# Patient Record
Sex: FEMALE | Race: WHITE | NOT HISPANIC OR LATINO
[De-identification: names, ages, dates, MRNs, and addresses within clinical notes are randomized per-mention and may not be internally consistent; named-entity substitution may affect disease eponyms.]

---

## 2017-11-10 PROBLEM — Z00.00 ENCOUNTER FOR PREVENTIVE HEALTH EXAMINATION: Status: ACTIVE | Noted: 2017-11-10

## 2017-11-12 ENCOUNTER — RECORD ABSTRACTING (OUTPATIENT)
Age: 52
End: 2017-11-12

## 2017-11-12 DIAGNOSIS — Z82.49 FAMILY HISTORY OF ISCHEMIC HEART DISEASE AND OTHER DISEASES OF THE CIRCULATORY SYSTEM: ICD-10-CM

## 2017-11-15 ENCOUNTER — APPOINTMENT (OUTPATIENT)
Dept: FAMILY MEDICINE | Facility: CLINIC | Age: 52
End: 2017-11-15
Payer: COMMERCIAL

## 2017-11-15 VITALS
HEART RATE: 80 BPM | HEIGHT: 64 IN | OXYGEN SATURATION: 98 % | TEMPERATURE: 98.1 F | DIASTOLIC BLOOD PRESSURE: 86 MMHG | SYSTOLIC BLOOD PRESSURE: 142 MMHG | BODY MASS INDEX: 33.8 KG/M2 | WEIGHT: 198 LBS | RESPIRATION RATE: 16 BRPM

## 2017-11-15 PROCEDURE — 99213 OFFICE O/P EST LOW 20 MIN: CPT

## 2018-01-17 ENCOUNTER — RX RENEWAL (OUTPATIENT)
Age: 53
End: 2018-01-17

## 2018-01-17 RX ORDER — ALPRAZOLAM 0.5 MG/1
0.5 TABLET ORAL
Refills: 0 | Status: DISCONTINUED | COMMUNITY
End: 2018-01-17

## 2018-02-07 ENCOUNTER — RX RENEWAL (OUTPATIENT)
Age: 53
End: 2018-02-07

## 2018-04-12 ENCOUNTER — MEDICATION RENEWAL (OUTPATIENT)
Age: 53
End: 2018-04-12

## 2018-06-14 ENCOUNTER — MEDICATION RENEWAL (OUTPATIENT)
Age: 53
End: 2018-06-14

## 2018-08-31 ENCOUNTER — APPOINTMENT (OUTPATIENT)
Dept: FAMILY MEDICINE | Facility: CLINIC | Age: 53
End: 2018-08-31
Payer: COMMERCIAL

## 2018-08-31 VITALS
BODY MASS INDEX: 30.39 KG/M2 | WEIGHT: 178 LBS | HEART RATE: 98 BPM | DIASTOLIC BLOOD PRESSURE: 70 MMHG | RESPIRATION RATE: 14 BRPM | SYSTOLIC BLOOD PRESSURE: 124 MMHG | OXYGEN SATURATION: 99 % | HEIGHT: 64 IN

## 2018-08-31 DIAGNOSIS — D18.01 HEMANGIOMA OF SKIN AND SUBCUTANEOUS TISSUE: ICD-10-CM

## 2018-08-31 PROCEDURE — 99214 OFFICE O/P EST MOD 30 MIN: CPT

## 2018-08-31 NOTE — END OF VISIT
[Time Spent: ___ minutes] : I have spent [unfilled] minutes of face to face time with the patient [>50% of Time Spent on Counseling and Coordination of Care for  ___] : Greater than 50% of the encounter time was spent on counseling and coordination of care for [unfilled]

## 2018-08-31 NOTE — HISTORY OF PRESENT ILLNESS
[FreeTextEntry1] : patient presents for med check \par also c/o scratchy throat, + cough, + RN/PND, + HA, + ear pain, + sinus pressure, - apolonia, - temp, x 2-3 days [de-identified] : 54 yo wf here for med check for anxiety\par She also has a scratchy throat cough and post nasal drip for 3 days with no relief with otc agents\par \par I am going to [place] next week\par \par I have a lump in my left arm\par \par I was getting a flash of lightening in my eye\par \par I might be getting arthritis in my left knee I get a ache when I go up stairs.\par \par I have 2 spots- left cheek ear area and left chest area

## 2018-08-31 NOTE — REVIEW OF SYSTEMS
[Fatigue] : fatigue [Recent Change In Weight] : ~T recent weight change [Nasal Discharge] : nasal discharge [Sore Throat] : sore throat [Palpitations] : palpitations [Negative] : Genitourinary

## 2018-08-31 NOTE — PHYSICAL EXAM
[Looks Tired] : appears tired [Hoarse] : was hoarse [Normal Rhythm/Effort] : normal respiratory rhythm and effort [Clear Bilaterally] : the lungs were clear to auscultation bilaterally [Normal to Percussion] : the lungs were normal to percussion [Normal] : palpation of the chest was normal [Normal Rate] : normal [Normal S1] : normal S1 [Normal S2] : normal S2 [No Murmur] : no murmurs heard [No Pitting Edema] : no pitting edema present [2+] : left 2+ [No Abnormalities] : the abdominal aorta was not enlarged and no bruit was heard [Harsh] : was not harsh [S3] : no S3 [S4] : no S4 [Right Carotid Bruit] : no bruit heard over the right carotid [Left Carotid Bruit] : no bruit heard over the left carotid [Right Femoral Bruit] : no bruit heard over the right femoral artery [Left Femoral Bruit] : no bruit heard over the left femoral artery [de-identified] : nasal congestion throat red [de-identified] : left elbow swollen lump medial elbow.   left knee crepitus [de-identified] : left cheek brown raised lesion   left chest cherry angioma

## 2018-08-31 NOTE — ASSESSMENT
[FreeTextEntry1] :  As per usual protocol the patient was advised in regards to the risks of driving when on medications with side effects of dizziness or drowsiness. Patient has been assessed  for increase risk for respiratory depression. Istop checked.\par follow up opthalmology, dermatology, orthopedic, colonoscopy, \par Take antibiotics,  rest,  increase fluids, wash hands to prevent the spread of  infection . Take mucinex dm over the counter. Take tylenol or advil for fever or body aches. Follow up if no better or worse respiratory symptoms.\par follow up cpe

## 2019-01-02 ENCOUNTER — MEDICATION RENEWAL (OUTPATIENT)
Age: 54
End: 2019-01-02

## 2019-01-07 ENCOUNTER — APPOINTMENT (OUTPATIENT)
Dept: FAMILY MEDICINE | Facility: CLINIC | Age: 54
End: 2019-01-07
Payer: COMMERCIAL

## 2019-01-07 VITALS
OXYGEN SATURATION: 98 % | SYSTOLIC BLOOD PRESSURE: 124 MMHG | HEIGHT: 64 IN | WEIGHT: 171 LBS | HEART RATE: 88 BPM | RESPIRATION RATE: 14 BRPM | BODY MASS INDEX: 29.19 KG/M2 | DIASTOLIC BLOOD PRESSURE: 74 MMHG

## 2019-01-07 DIAGNOSIS — Z87.09 PERSONAL HISTORY OF OTHER DISEASES OF THE RESPIRATORY SYSTEM: ICD-10-CM

## 2019-01-07 LAB — CYTOLOGY CVX/VAG DOC THIN PREP: NORMAL

## 2019-01-07 PROCEDURE — 99213 OFFICE O/P EST LOW 20 MIN: CPT

## 2019-01-07 NOTE — HISTORY OF PRESENT ILLNESS
[FreeTextEntry1] : Patient presents for med check and blood work  [de-identified] : 52 yo wf here for follow up and anxiety\par I want to up the medication. I took 1 mg and slept so much better. Out of nowhere I get shaky and feel racy inside. I am not sure if it is attributed to perimenopause.   I haven't had it for one week and took benadryl for sleep. It didn’t help my sleep I did not go through withdrawal. I don’t want to take anything else like an antidepressant or anxiety. \par I fell the other day and hurt my shoulder\par I am getting a lump on my thumb\par I started walking and doing carb. \par

## 2019-01-07 NOTE — PLAN
[FreeTextEntry1] : try estroven for hot flashes. renew xanax. patient not suicidal.  As per usual protocol the patient was advised in regards to the risks of driving when on medications with side effects of dizziness or drowsiness. Patient has been assessed  for increase risk for respiratory depression. Istop checked.\par Labs drawn/ specimens obtained  in office on this date of service  for evaluation of   assessed conditions -     hep c hiv hyperlipidemia to be run at [facility].\par

## 2019-01-07 NOTE — HEALTH RISK ASSESSMENT
[No falls in past year] : Patient reported no falls in the past year [0] : 2) Feeling down, depressed, or hopeless: Not at all (0) [] : No [de-identified] : no [de-identified] : no [[id number]] : 0

## 2019-01-08 ENCOUNTER — MED ADMIN CHARGE (OUTPATIENT)
Age: 54
End: 2019-01-08

## 2019-01-08 LAB
ALBUMIN SERPL ELPH-MCNC: 4.5 G/DL
ALP BLD-CCNC: 48 U/L
ALT SERPL-CCNC: 11 U/L
ANION GAP SERPL CALC-SCNC: 9 MMOL/L
AST SERPL-CCNC: 15 U/L
BASOPHILS # BLD AUTO: 0.02 K/UL
BASOPHILS NFR BLD AUTO: 0.3 %
BILIRUB SERPL-MCNC: 0.8 MG/DL
BUN SERPL-MCNC: 22 MG/DL
CALCIUM SERPL-MCNC: 9.8 MG/DL
CHLORIDE SERPL-SCNC: 107 MMOL/L
CHOLEST SERPL-MCNC: 255 MG/DL
CHOLEST/HDLC SERPL: 3.8 RATIO
CO2 SERPL-SCNC: 27 MMOL/L
CREAT SERPL-MCNC: 0.74 MG/DL
EOSINOPHIL # BLD AUTO: 0.19 K/UL
EOSINOPHIL NFR BLD AUTO: 2.6 %
GLUCOSE SERPL-MCNC: 97 MG/DL
HBA1C MFR BLD HPLC: 5.6 %
HCT VFR BLD CALC: 45.6 %
HCV AB SER QL: NONREACTIVE
HCV S/CO RATIO: 0.15 S/CO
HDLC SERPL-MCNC: 67 MG/DL
HGB BLD-MCNC: 14.5 G/DL
HIV1+2 AB SPEC QL IA.RAPID: NONREACTIVE
IMM GRANULOCYTES NFR BLD AUTO: 0.3 %
LDLC SERPL CALC-MCNC: 168 MG/DL
LYMPHOCYTES # BLD AUTO: 2.05 K/UL
LYMPHOCYTES NFR BLD AUTO: 27.6 %
MAN DIFF?: NORMAL
MCHC RBC-ENTMCNC: 27.8 PG
MCHC RBC-ENTMCNC: 31.8 GM/DL
MCV RBC AUTO: 87.4 FL
MONOCYTES # BLD AUTO: 0.39 K/UL
MONOCYTES NFR BLD AUTO: 5.2 %
NEUTROPHILS # BLD AUTO: 4.77 K/UL
NEUTROPHILS NFR BLD AUTO: 64 %
PLATELET # BLD AUTO: 313 K/UL
POTASSIUM SERPL-SCNC: 5 MMOL/L
PROT SERPL-MCNC: 7.6 G/DL
RBC # BLD: 5.22 M/UL
RBC # FLD: 12.9 %
SODIUM SERPL-SCNC: 143 MMOL/L
T3FREE SERPL-MCNC: 2.57 PG/ML
T4 FREE SERPL-MCNC: 1.4 NG/DL
TRIGL SERPL-MCNC: 99 MG/DL
TSH SERPL-ACNC: 0.69 UIU/ML
WBC # FLD AUTO: 7.44 K/UL

## 2019-03-07 ENCOUNTER — RX RENEWAL (OUTPATIENT)
Age: 54
End: 2019-03-07

## 2019-04-02 ENCOUNTER — APPOINTMENT (OUTPATIENT)
Dept: FAMILY MEDICINE | Facility: CLINIC | Age: 54
End: 2019-04-02
Payer: COMMERCIAL

## 2019-04-02 VITALS
HEIGHT: 64 IN | HEART RATE: 100 BPM | BODY MASS INDEX: 28.34 KG/M2 | RESPIRATION RATE: 14 BRPM | SYSTOLIC BLOOD PRESSURE: 128 MMHG | WEIGHT: 166 LBS | DIASTOLIC BLOOD PRESSURE: 80 MMHG | OXYGEN SATURATION: 99 %

## 2019-04-02 PROCEDURE — 36415 COLL VENOUS BLD VENIPUNCTURE: CPT

## 2019-04-02 PROCEDURE — 99213 OFFICE O/P EST LOW 20 MIN: CPT | Mod: 25

## 2019-04-02 NOTE — ASSESSMENT
[FreeTextEntry1] : Basic cardiovascular prevention measures are advised including regular exercise, surveillance medical examination, and prudent portion-controlled low fat diet, rich in a variety of vegetables with minimal added sugars, refined starches, and no artificially hydrogenated oils.\par Labs drawn/ specimens obtained  in office on this date of service  for evaluation of   assessed conditions -  lipid cmp    to be run at [facility].\par  As per usual protocol the patient was advised in regards to the risks of driving when on medications with side effects of dizziness or drowsiness. Patient has been assessed  for increase risk for respiratory depression. Patient denies suicidal ideations or plan.  Istop checked.\par colonscopy due she refused- she doesn’t like anesthesia. she  will do cologuard. form given\par

## 2019-04-02 NOTE — HISTORY OF PRESENT ILLNESS
[FreeTextEntry1] : Patient presents for 3 month check\par  [de-identified] : 54 yo wf here for follow up on medications for anxiety She refused colonoscopy but will do cologuard\par her  had aneurysm surgery and she has been anxious and her stomach has been bothering her so she is taking the xanax and the omeprazole. She is tolerating the cholesterol meds. She feels otherwise well no cp sob or leg edema or myalgia

## 2019-04-03 LAB
ALBUMIN SERPL ELPH-MCNC: 4.4 G/DL
ALP BLD-CCNC: 55 U/L
ALT SERPL-CCNC: 18 U/L
ANION GAP SERPL CALC-SCNC: 11 MMOL/L
AST SERPL-CCNC: 16 U/L
BILIRUB SERPL-MCNC: 0.8 MG/DL
BUN SERPL-MCNC: 19 MG/DL
CALCIUM SERPL-MCNC: 9.7 MG/DL
CHLORIDE SERPL-SCNC: 104 MMOL/L
CHOLEST SERPL-MCNC: 153 MG/DL
CHOLEST/HDLC SERPL: 3.1 RATIO
CO2 SERPL-SCNC: 26 MMOL/L
CREAT SERPL-MCNC: 0.65 MG/DL
GLUCOSE SERPL-MCNC: 97 MG/DL
HDLC SERPL-MCNC: 49 MG/DL
LDLC SERPL CALC-MCNC: 77 MG/DL
POTASSIUM SERPL-SCNC: 4 MMOL/L
PROT SERPL-MCNC: 6.7 G/DL
SODIUM SERPL-SCNC: 141 MMOL/L
TRIGL SERPL-MCNC: 137 MG/DL

## 2019-06-03 ENCOUNTER — MEDICATION RENEWAL (OUTPATIENT)
Age: 54
End: 2019-06-03

## 2019-07-10 ENCOUNTER — APPOINTMENT (OUTPATIENT)
Dept: FAMILY MEDICINE | Facility: CLINIC | Age: 54
End: 2019-07-10
Payer: COMMERCIAL

## 2019-07-10 VITALS
OXYGEN SATURATION: 98 % | BODY MASS INDEX: 28.34 KG/M2 | SYSTOLIC BLOOD PRESSURE: 124 MMHG | RESPIRATION RATE: 14 BRPM | HEIGHT: 64 IN | WEIGHT: 166 LBS | HEART RATE: 84 BPM | DIASTOLIC BLOOD PRESSURE: 70 MMHG

## 2019-07-10 PROCEDURE — 99213 OFFICE O/P EST LOW 20 MIN: CPT | Mod: 25

## 2019-07-10 PROCEDURE — 36415 COLL VENOUS BLD VENIPUNCTURE: CPT

## 2019-07-10 NOTE — HISTORY OF PRESENT ILLNESS
[FreeTextEntry1] : Patient presents for med check\par  [de-identified] : 55 yo wf here for anxiety and hyperlipidemia.

## 2019-07-10 NOTE — HEALTH RISK ASSESSMENT
[No] : In the past 12 months have you used drugs other than those required for medical reasons? No [] : No [de-identified] : no [de-identified] : low carb

## 2019-07-10 NOTE — PHYSICAL EXAM
[No Acute Distress] : no acute distress [Well Nourished] : well nourished [Well Developed] : well developed [Well-Appearing] : well-appearing [Normal Sclera/Conjunctiva] : normal sclera/conjunctiva [PERRL] : pupils equal round and reactive to light [Normal Oropharynx] : the oropharynx was normal [Normal Outer Ear/Nose] : the outer ears and nose were normal in appearance [EOMI] : extraocular movements intact [No JVD] : no jugular venous distention [No Lymphadenopathy] : no lymphadenopathy [Supple] : supple [Thyroid Normal, No Nodules] : the thyroid was normal and there were no nodules present [No Respiratory Distress] : no respiratory distress  [Clear to Auscultation] : lungs were clear to auscultation bilaterally [No Accessory Muscle Use] : no accessory muscle use [Normal Rate] : normal rate  [Normal S1, S2] : normal S1 and S2 [Regular Rhythm] : with a regular rhythm [No Abdominal Bruit] : a ~M bruit was not heard ~T in the abdomen [No Murmur] : no murmur heard [No Carotid Bruits] : no carotid bruits [Pedal Pulses Present] : the pedal pulses are present [No Edema] : there was no peripheral edema [No Varicosities] : no varicosities [No Palpable Aorta] : no palpable aorta [No Extremity Clubbing/Cyanosis] : no extremity clubbing/cyanosis [Soft] : abdomen soft [Non-distended] : non-distended [Non Tender] : non-tender [No HSM] : no HSM [Normal Bowel Sounds] : normal bowel sounds [No Masses] : no abdominal mass palpated [Normal Posterior Cervical Nodes] : no posterior cervical lymphadenopathy [Normal Anterior Cervical Nodes] : no anterior cervical lymphadenopathy [No CVA Tenderness] : no CVA  tenderness [No Spinal Tenderness] : no spinal tenderness [Grossly Normal Strength/Tone] : grossly normal strength/tone [No Joint Swelling] : no joint swelling [No Focal Deficits] : no focal deficits [No Rash] : no rash [Coordination Grossly Intact] : coordination grossly intact [Normal Gait] : normal gait [Normal Affect] : the affect was normal [Deep Tendon Reflexes (DTR)] : deep tendon reflexes were 2+ and symmetric [Normal Insight/Judgement] : insight and judgment were intact

## 2019-07-11 LAB
ALBUMIN SERPL ELPH-MCNC: 4.4 G/DL
ALP BLD-CCNC: 77 U/L
ALT SERPL-CCNC: 16 U/L
ANION GAP SERPL CALC-SCNC: 9 MMOL/L
AST SERPL-CCNC: 15 U/L
BILIRUB SERPL-MCNC: 0.3 MG/DL
BUN SERPL-MCNC: 22 MG/DL
CALCIUM SERPL-MCNC: 9.4 MG/DL
CHLORIDE SERPL-SCNC: 107 MMOL/L
CHOLEST SERPL-MCNC: 168 MG/DL
CHOLEST/HDLC SERPL: 2.6 RATIO
CO2 SERPL-SCNC: 27 MMOL/L
CREAT SERPL-MCNC: 0.65 MG/DL
GLUCOSE SERPL-MCNC: 114 MG/DL
HDLC SERPL-MCNC: 66 MG/DL
LDLC SERPL CALC-MCNC: 85 MG/DL
POTASSIUM SERPL-SCNC: 4.7 MMOL/L
PROT SERPL-MCNC: 6.6 G/DL
SODIUM SERPL-SCNC: 143 MMOL/L
TRIGL SERPL-MCNC: 83 MG/DL

## 2019-10-16 ENCOUNTER — APPOINTMENT (OUTPATIENT)
Dept: FAMILY MEDICINE | Facility: CLINIC | Age: 54
End: 2019-10-16
Payer: COMMERCIAL

## 2019-10-16 VITALS
SYSTOLIC BLOOD PRESSURE: 120 MMHG | HEART RATE: 74 BPM | WEIGHT: 168 LBS | BODY MASS INDEX: 28.68 KG/M2 | DIASTOLIC BLOOD PRESSURE: 70 MMHG | HEIGHT: 64 IN | OXYGEN SATURATION: 98 % | RESPIRATION RATE: 14 BRPM

## 2019-10-16 LAB
ALBUMIN SERPL ELPH-MCNC: 4.6 G/DL
ALP BLD-CCNC: 56 U/L
ALT SERPL-CCNC: 32 U/L
ANION GAP SERPL CALC-SCNC: 12 MMOL/L
AST SERPL-CCNC: 22 U/L
BILIRUB SERPL-MCNC: 1.2 MG/DL
BUN SERPL-MCNC: 16 MG/DL
CALCIUM SERPL-MCNC: 9.7 MG/DL
CHLORIDE SERPL-SCNC: 101 MMOL/L
CO2 SERPL-SCNC: 27 MMOL/L
CREAT SERPL-MCNC: 0.67 MG/DL
FERRITIN SERPL-MCNC: 150 NG/ML
GLUCOSE SERPL-MCNC: 85 MG/DL
IRON SATN MFR SERPL: 42 %
IRON SERPL-MCNC: 126 UG/DL
POTASSIUM SERPL-SCNC: 4.4 MMOL/L
PROT SERPL-MCNC: 7 G/DL
SODIUM SERPL-SCNC: 140 MMOL/L
TIBC SERPL-MCNC: 302 UG/DL
UIBC SERPL-MCNC: 176 UG/DL

## 2019-10-16 PROCEDURE — 36415 COLL VENOUS BLD VENIPUNCTURE: CPT

## 2019-10-16 PROCEDURE — 99213 OFFICE O/P EST LOW 20 MIN: CPT | Mod: 25

## 2019-10-16 RX ORDER — IPRATROPIUM BROMIDE 42 UG/1
0.06 SPRAY NASAL
Qty: 1 | Refills: 11 | Status: COMPLETED | COMMUNITY
Start: 2019-07-10 | End: 2019-10-16

## 2019-10-16 NOTE — HISTORY OF PRESENT ILLNESS
[de-identified] : 54 year old female presenting for a medication renewal follow up. Patient is here for follow up for hyperlipidemia and anxiety.  Patient is taking one tablet at night of alprazolam and this is helping for anxiety, and for acute anxiety during the week, about 1-2 times per week. Patient is feeling well.  She denies any myalgias, denies chest pain, palpitations.  she takes all medications as prescribed.  [FreeTextEntry1] : Patient presents for med check\par

## 2019-10-16 NOTE — COUNSELING
[Encouraged to maintain food diary] : Encouraged to maintain food diary [Benefits of weight loss discussed] : Benefits of weight loss discussed [None] : None [Encouraged to increase physical activity] : Encouraged to increase physical activity [Good understanding] : Patient has a good understanding of disease, goals and obesity follow-up plan

## 2019-10-16 NOTE — ASSESSMENT
[FreeTextEntry1] : Discussed prior lipid levels and encouraged patient to continue taking medication and prescribed with decreased carbohydrate intake and decreased saturated fats.\par \par Basic cardiovascular prevention measures are advised including regular exercise, surveillance medical examination, and prudent portion-controlled low fat diet, rich in a variety of vegetables with minimal added sugars, refined starches, and no artificially hydrogenated oils.\par \par  As per usual protocol the patient was advised in regards to the risks of driving when on medications with side effects of dizziness or drowsiness. Patient has been assessed  for increase risk for respiratory depression. Patient denies suicidal ideations or plan.  Istop checked.\par \par Labs drawn/ specimens obtained  in office on this date of service  for evaluation of   assessed conditions -      to be run at [facility]. Lipid panel, Iron deficiency, CMP.   Patient requesting iron level to be checked as she has had low iron levels in the past and reports some fatigue. \par \par To Follow up with lab results.\par \par This assessment was perform by Yolie Gómez , RN  Nurse Practitioner student  from [facility] with Nida Espinosa supervising FNP.\par

## 2019-10-16 NOTE — HEALTH RISK ASSESSMENT
[1 or 2 (0 pts)] : 1 or 2 (0 points) [Never (0 pts)] : Never (0 points) [No] : In the past 12 months have you used drugs other than those required for medical reasons? No [No falls in past year] : Patient reported no falls in the past year [0] : 2) Feeling down, depressed, or hopeless: Not at all (0) [] : No [de-identified] : none [de-identified] : no specialists [Audit-CScore] : 0 [de-identified] : Not active  [de-identified] : healthy diet, low carb [[id number]] : 0

## 2019-10-16 NOTE — REVIEW OF SYSTEMS
[Anxiety] : anxiety [Negative] : Heme/Lymph [Fever] : no fever [Chills] : no chills [Fatigue] : fatigue [Night Sweats] : no night sweats [Hot Flashes] : no hot flashes [Recent Change In Weight] : ~T no recent weight change [FreeTextEntry2] : she reports fatigue and states she has felt this tired before due to low iron levels

## 2019-10-17 LAB
CHOLEST SERPL-MCNC: 135 MG/DL
CHOLEST/HDLC SERPL: 2.6 RATIO
HDLC SERPL-MCNC: 53 MG/DL
LDLC SERPL CALC-MCNC: 63 MG/DL
TRIGL SERPL-MCNC: 93 MG/DL

## 2020-01-14 ENCOUNTER — APPOINTMENT (OUTPATIENT)
Dept: FAMILY MEDICINE | Facility: CLINIC | Age: 55
End: 2020-01-14
Payer: COMMERCIAL

## 2020-01-14 VITALS
BODY MASS INDEX: 29.88 KG/M2 | SYSTOLIC BLOOD PRESSURE: 120 MMHG | TEMPERATURE: 98.8 F | WEIGHT: 175 LBS | HEIGHT: 64 IN | RESPIRATION RATE: 14 BRPM | DIASTOLIC BLOOD PRESSURE: 82 MMHG | HEART RATE: 77 BPM | OXYGEN SATURATION: 98 %

## 2020-01-14 DIAGNOSIS — Z87.09 PERSONAL HISTORY OF OTHER DISEASES OF THE RESPIRATORY SYSTEM: ICD-10-CM

## 2020-01-14 DIAGNOSIS — M79.89 OTHER SPECIFIED SOFT TISSUE DISORDERS: ICD-10-CM

## 2020-01-14 DIAGNOSIS — Z86.79 PERSONAL HISTORY OF OTHER DISEASES OF THE CIRCULATORY SYSTEM: ICD-10-CM

## 2020-01-14 DIAGNOSIS — Z13.220 ENCOUNTER FOR SCREENING FOR LIPOID DISORDERS: ICD-10-CM

## 2020-01-14 DIAGNOSIS — M25.569 PAIN IN UNSPECIFIED KNEE: ICD-10-CM

## 2020-01-14 DIAGNOSIS — Z13.0 ENCOUNTER FOR SCREENING FOR DISEASES OF THE BLOOD AND BLOOD-FORMING ORGANS AND CERTAIN DISORDERS INVOLVING THE IMMUNE MECHANISM: ICD-10-CM

## 2020-01-14 DIAGNOSIS — Z13.1 ENCOUNTER FOR SCREENING FOR DIABETES MELLITUS: ICD-10-CM

## 2020-01-14 PROCEDURE — 99213 OFFICE O/P EST LOW 20 MIN: CPT | Mod: 25

## 2020-01-14 PROCEDURE — 36415 COLL VENOUS BLD VENIPUNCTURE: CPT

## 2020-01-14 NOTE — HEALTH RISK ASSESSMENT
[Never (0 pts)] : Never (0 points) [1 or 2 (0 pts)] : 1 or 2 (0 points) [No falls in past year] : Patient reported no falls in the past year [No] : In the past 12 months have you used drugs other than those required for medical reasons? No [0] : 1) Little interest or pleasure doing things: Not at all (0) [] : No [de-identified] : no specialists [de-identified] : none [Audit-CScore] : 0 [de-identified] : Not active  [[id number]] : 0 [de-identified] : healthy diet, low carb

## 2020-01-14 NOTE — COUNSELING
[Benefits of weight loss discussed] : Benefits of weight loss discussed [Encouraged to increase physical activity] : Encouraged to increase physical activity [Encouraged to maintain food diary] : Encouraged to maintain food diary [None] : None [Good understanding] : Patient has a good understanding of lifestyle changes and steps needed to achieve self management goal

## 2020-01-14 NOTE — REVIEW OF SYSTEMS
[Fatigue] : fatigue [Anxiety] : anxiety [Negative] : Psychiatric [Fever] : no fever [Chills] : no chills [Hot Flashes] : no hot flashes [Night Sweats] : no night sweats [Recent Change In Weight] : ~T no recent weight change [FreeTextEntry2] : she reports fatigue and states she has felt this tired before due to low iron levels

## 2020-01-14 NOTE — HISTORY OF PRESENT ILLNESS
[FreeTextEntry1] : Patient presents for med check\par  [de-identified] : 54 year old female presenting for a medication renewal follow up.  I ate a lot over the holidays.  I always feel like I am getting a cold. I have a runny nose then it stops I feel tired then I don’t feel tired.  Patient is here for follow up for hyperlipidemia and anxiety.  Patient is taking one tablet at night of alprazolam and this is helping for anxiety, and for acute anxiety during the week, about 1-2 times per week. Patient is feeling well.  She denies any myalgias, denies chest pain, palpitations.  she takes all medications as prescribed.

## 2020-01-14 NOTE — ASSESSMENT
[FreeTextEntry1] : Discussed prior lipid levels and encouraged patient to continue taking medication and prescribed with decreased carbohydrate intake and decreased saturated fats.\par \par Basic cardiovascular prevention measures are advised including regular exercise, surveillance medical examination, and prudent portion-controlled low fat diet, rich in a variety of vegetables with minimal added sugars, refined starches, and no artificially hydrogenated oils.\par \par  As per usual protocol the patient was advised in regards to the risks of driving when on medications with side effects of dizziness or drowsiness. Patient has been assessed  for increase risk for respiratory depression. Patient denies suicidal ideations or plan.  Istop checked.\par \par Labs drawn/ specimens obtained  in office on this date of service  for evaluation of   assessed conditions -      to be run at [facility]. Lipid panel, Iron deficiency, CMP.   Patient requesting iron level to be checked as she has had low iron levels in the past and reports some fatigue. \par \par To Follow up with lab results.\par \par This assessment was perform by Yolie Gómez , RN  Nurse Practitioner student  from [facility] with Nida Espinosa supervising FNP.\par

## 2020-01-15 LAB
ALBUMIN SERPL ELPH-MCNC: 4.5 G/DL
ALP BLD-CCNC: 55 U/L
ALT SERPL-CCNC: 27 U/L
ANION GAP SERPL CALC-SCNC: 13 MMOL/L
AST SERPL-CCNC: 20 U/L
BILIRUB SERPL-MCNC: 0.9 MG/DL
BUN SERPL-MCNC: 20 MG/DL
CALCIUM SERPL-MCNC: 9.8 MG/DL
CHLORIDE SERPL-SCNC: 103 MMOL/L
CHOLEST SERPL-MCNC: 237 MG/DL
CHOLEST/HDLC SERPL: 2.9 RATIO
CO2 SERPL-SCNC: 23 MMOL/L
CREAT SERPL-MCNC: 0.59 MG/DL
GLUCOSE SERPL-MCNC: 95 MG/DL
HDLC SERPL-MCNC: 83 MG/DL
LDLC SERPL CALC-MCNC: 132 MG/DL
POTASSIUM SERPL-SCNC: 4.4 MMOL/L
PROT SERPL-MCNC: 6.9 G/DL
SODIUM SERPL-SCNC: 139 MMOL/L
TRIGL SERPL-MCNC: 111 MG/DL

## 2020-03-31 ENCOUNTER — APPOINTMENT (OUTPATIENT)
Dept: FAMILY MEDICINE | Facility: CLINIC | Age: 55
End: 2020-03-31

## 2020-06-15 ENCOUNTER — APPOINTMENT (OUTPATIENT)
Dept: FAMILY MEDICINE | Facility: CLINIC | Age: 55
End: 2020-06-15
Payer: SELF-PAY

## 2020-06-15 VITALS
DIASTOLIC BLOOD PRESSURE: 76 MMHG | OXYGEN SATURATION: 98 % | SYSTOLIC BLOOD PRESSURE: 118 MMHG | BODY MASS INDEX: 28.85 KG/M2 | WEIGHT: 169 LBS | RESPIRATION RATE: 14 BRPM | TEMPERATURE: 98.4 F | HEIGHT: 64 IN | HEART RATE: 80 BPM

## 2020-06-15 PROCEDURE — 99213 OFFICE O/P EST LOW 20 MIN: CPT | Mod: 25

## 2020-06-15 PROCEDURE — 36415 COLL VENOUS BLD VENIPUNCTURE: CPT

## 2020-06-15 NOTE — REVIEW OF SYSTEMS
[Fatigue] : fatigue [Anxiety] : anxiety [Negative] : Heme/Lymph [Fever] : no fever [Hot Flashes] : no hot flashes [Night Sweats] : no night sweats [Chills] : no chills [FreeTextEntry2] : she reports fatigue and states she has felt this tired before due to low iron levels [Recent Change In Weight] : ~T no recent weight change

## 2020-06-15 NOTE — HISTORY OF PRESENT ILLNESS
[FreeTextEntry1] : Patient presents for med check\par  [de-identified] : 54 year old female presenting for a medication renewal follow up.  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 I get headaches I think it is allergies. I am still  tired. \par \par

## 2020-06-15 NOTE — ASSESSMENT
[FreeTextEntry1] : Pt has no insurance and will do minimal lab today. \par \par Basic cardiovascular prevention measures are advised including regular exercise, surveillance medical examination, and prudent portion-controlled low fat diet, rich in a variety of vegetables with minimal added sugars, refined starches, and no artificially hydrogenated oils.\par \par  As per usual protocol the patient was advised in regards to the risks of driving when on medications with side effects of dizziness or drowsiness. Patient has been assessed  for increase risk for respiratory depression. Patient denies suicidal ideations or plan.  Istop checked.\par \par Labs drawn/ specimens obtained  in office on this date of service  for evaluation of   assessed conditions - lipid     to be run at [facility]. Lipid panel, Iron deficiency, CMP.   Patient requesting iron level to be checked as she has had low iron levels in the past and reports some fatigue. \par \par To Follow up with lab results.\par \par This assessment was perform by Yolie Gómez , RN  Nurse Practitioner student  from [facility] with Nida Espinosa supervising FNP.\par

## 2020-06-15 NOTE — HEALTH RISK ASSESSMENT
[1 or 2 (0 pts)] : 1 or 2 (0 points) [Never (0 pts)] : Never (0 points) [No] : In the past 12 months have you used drugs other than those required for medical reasons? No [No falls in past year] : Patient reported no falls in the past year [0] : 2) Feeling down, depressed, or hopeless: Not at all (0) [] : No [de-identified] : no specialists [de-identified] : none [Audit-CScore] : 0 [de-identified] : Not active  [[id number]] : 0 [de-identified] : healthy diet, low carb

## 2020-06-16 LAB
CHOLEST SERPL-MCNC: 125 MG/DL
CHOLEST/HDLC SERPL: 2.1 RATIO
HDLC SERPL-MCNC: 60 MG/DL
LDLC SERPL CALC-MCNC: 43 MG/DL
TRIGL SERPL-MCNC: 109 MG/DL

## 2020-10-20 ENCOUNTER — APPOINTMENT (OUTPATIENT)
Dept: FAMILY MEDICINE | Facility: CLINIC | Age: 55
End: 2020-10-20
Payer: SELF-PAY

## 2020-10-20 VITALS
SYSTOLIC BLOOD PRESSURE: 146 MMHG | OXYGEN SATURATION: 97 % | WEIGHT: 169 LBS | DIASTOLIC BLOOD PRESSURE: 90 MMHG | RESPIRATION RATE: 16 BRPM | HEART RATE: 76 BPM | BODY MASS INDEX: 28.85 KG/M2 | HEIGHT: 64 IN

## 2020-10-20 VITALS — TEMPERATURE: 98 F

## 2020-10-20 PROCEDURE — 99214 OFFICE O/P EST MOD 30 MIN: CPT

## 2020-10-20 NOTE — ASSESSMENT
[FreeTextEntry1] : - Patient advised of harmful side effects and risk of dependence with long term use of benzodiazepines. Advised caution related to sedative effect and and respiratory depression. Advised not to take while driving or in combination with alcohol. Meditation, mindfulness, exercise, and psychotherapy encouraged. ISTOP checked. Reference #: [id number]. Narcotic contract signed, Urine drug screen ordered. \par  -Bp stable but elevated today - monitor bp at home twice daily. RTO in 2 weeks for follow up. Pt will RTO sooner if bp persistently 160/90. Pt will go to ER if chest pain or sob arise. pt advised to follow low salt, low fat diet. Pt encouraged to exercise and increase weight loss efforts.\par - Mammo rx given, pt agrees to complete, difficult since lack of insurance.\par -Advised to get pap.

## 2020-10-20 NOTE — HISTORY OF PRESENT ILLNESS
[FreeTextEntry1] : pt. presents for medication renewal  [de-identified] : Patient presents today for follow up and medications renewal. States she uses xanax intermittently for anxiety. Denies side effects, aberrant behaviors, or suicidal thoughts/plans. Patient last took the medication on friday.

## 2020-10-23 LAB
AMPHET UR-MCNC: NEGATIVE
BARBITURATES UR-MCNC: NEGATIVE
BENZODIAZ UR-MCNC: NEGATIVE
COCAINE METAB.OTHER UR-MCNC: NEGATIVE
CREATININE, URINE: 108.2 MG/DL
METHADONE UR-MCNC: NEGATIVE
METHAQUALONE UR-MCNC: NEGATIVE
OPIATES UR-MCNC: NEGATIVE
PCP UR-MCNC: NEGATIVE
PROPOXYPH UR QL: NEGATIVE
THC UR QL: NEGATIVE

## 2020-11-03 ENCOUNTER — APPOINTMENT (OUTPATIENT)
Dept: FAMILY MEDICINE | Facility: CLINIC | Age: 55
End: 2020-11-03
Payer: SELF-PAY

## 2020-11-03 DIAGNOSIS — Z87.09 PERSONAL HISTORY OF OTHER DISEASES OF THE RESPIRATORY SYSTEM: ICD-10-CM

## 2020-11-03 PROCEDURE — 99442: CPT

## 2020-11-03 NOTE — ASSESSMENT
[FreeTextEntry1] : Rest, fluids, xyzal, vitamin C\par     - advised this is possibly covid despite negative results 2 days prior, encouraged to complete quarantine and isolate from others, wear mask and frequent hand hygiene\par     - patient instructed to follow up if symptoms worsen or persist, if fevers develop, does not get better in 7 days.\par     - Watchful waiting - Patient will wait at least 3 days before initiating antibiotic, will try conservative/supportive treatment first. educated on antibiotic resistance.\par

## 2020-11-03 NOTE — HISTORY OF PRESENT ILLNESS
[Home] : at home, [unfilled] , at the time of the visit. [Medical Office: ([facility])___] : at the medical office located in  [Verbal consent obtained from patient] : the patient, [unfilled] [FreeTextEntry8] : Patient reports today with a chief complaint of sinus pain x 3 days. Patient states she has associated nasal congestion, post nasal drip. She had fever but it has since resolved. Patient states her  was positive for covid but she tested negative on Wednesday. She has been taking emergen-c and otc cough medicine with mild relief. States she does not toelrate nasal sprays well. Denies chills, body aches, cough, sob, weakness.

## 2020-11-03 NOTE — REVIEW OF SYSTEMS
[Nasal Discharge] : nasal discharge [Negative] : Heme/Lymph [Fever] : fever [FreeTextEntry4] : sinus pain

## 2020-12-23 PROBLEM — Z87.09 HISTORY OF ACUTE SINUSITIS: Status: RESOLVED | Noted: 2020-11-03 | Resolved: 2020-12-23

## 2021-03-08 ENCOUNTER — APPOINTMENT (OUTPATIENT)
Dept: FAMILY MEDICINE | Facility: CLINIC | Age: 56
End: 2021-03-08
Payer: COMMERCIAL

## 2021-03-08 VITALS
WEIGHT: 179 LBS | SYSTOLIC BLOOD PRESSURE: 130 MMHG | BODY MASS INDEX: 30.56 KG/M2 | RESPIRATION RATE: 12 BRPM | OXYGEN SATURATION: 97 % | DIASTOLIC BLOOD PRESSURE: 80 MMHG | HEART RATE: 79 BPM | HEIGHT: 64 IN

## 2021-03-08 VITALS — TEMPERATURE: 97.2 F

## 2021-03-08 DIAGNOSIS — Z23 ENCOUNTER FOR IMMUNIZATION: ICD-10-CM

## 2021-03-08 DIAGNOSIS — M72.2 PLANTAR FASCIAL FIBROMATOSIS: ICD-10-CM

## 2021-03-08 DIAGNOSIS — Z11.52 ENCOUNTER FOR SCREENING FOR COVID-19: ICD-10-CM

## 2021-03-08 DIAGNOSIS — Z12.11 ENCOUNTER FOR SCREENING FOR MALIGNANT NEOPLASM OF COLON: ICD-10-CM

## 2021-03-08 DIAGNOSIS — R03.0 ELEVATED BLOOD-PRESSURE READING, W/OUT DIAGNOSIS OF HYPERTENSION: ICD-10-CM

## 2021-03-08 PROCEDURE — 99213 OFFICE O/P EST LOW 20 MIN: CPT

## 2021-03-08 PROCEDURE — 99072 ADDL SUPL MATRL&STAF TM PHE: CPT

## 2021-03-08 RX ORDER — LEVOFLOXACIN 500 MG/1
500 TABLET, FILM COATED ORAL DAILY
Qty: 7 | Refills: 0 | Status: DISCONTINUED | COMMUNITY
Start: 2018-08-31 | End: 2021-03-08

## 2021-03-08 RX ORDER — LEVOCETIRIZINE DIHYDROCHLORIDE 5 MG/1
5 TABLET ORAL DAILY
Qty: 20 | Refills: 0 | Status: DISCONTINUED | COMMUNITY
Start: 2020-11-03 | End: 2021-03-08

## 2021-03-08 NOTE — PHYSICAL EXAM
[No Acute Distress] : no acute distress [Well Nourished] : well nourished [Well Developed] : well developed [Well-Appearing] : well-appearing [Normal Voice/Communication] : normal voice/communication [Coordination Grossly Intact] : coordination grossly intact [Speech Grossly Normal] : speech grossly normal [Normal Mood] : the mood was normal [de-identified] : tenderness on palpation of right calcaneal area plantar aspect

## 2021-03-08 NOTE — REVIEW OF SYSTEMS
[Suicidal] : not suicidal [Anxiety] : anxiety [Depression] : no depression [Negative] : Heme/Lymph [FreeTextEntry9] : foot pain

## 2021-03-08 NOTE — HISTORY OF PRESENT ILLNESS
[FreeTextEntry1] : pt presents for med check  [de-identified] : She presents for renewals of alprazolam and rosuvastatin.  She doesn't take the alprazolam often but when she does it helps with her anxiety.  She has pain on the bottoms of both feet especially when getting out of bed in the morning.

## 2021-03-08 NOTE — PLAN
[FreeTextEntry1] : the urine drug screen from October was reviewed, I-STOP was checked and alprazolam and rosuvastatin were renewed, she was advised to wear supportive shoes and to do heel stretching exercises, to follow up in 3-4 months or sooner prn and to see a podiatrist if the pain persists, she will schedule the mammogram

## 2021-06-01 ENCOUNTER — APPOINTMENT (OUTPATIENT)
Dept: FAMILY MEDICINE | Facility: CLINIC | Age: 56
End: 2021-06-01
Payer: COMMERCIAL

## 2021-06-01 VITALS
WEIGHT: 172 LBS | HEART RATE: 72 BPM | RESPIRATION RATE: 14 BRPM | SYSTOLIC BLOOD PRESSURE: 114 MMHG | OXYGEN SATURATION: 99 % | DIASTOLIC BLOOD PRESSURE: 68 MMHG | BODY MASS INDEX: 29.37 KG/M2 | HEIGHT: 64 IN | TEMPERATURE: 97.7 F

## 2021-06-01 PROCEDURE — 99213 OFFICE O/P EST LOW 20 MIN: CPT | Mod: 25

## 2021-06-01 PROCEDURE — 36415 COLL VENOUS BLD VENIPUNCTURE: CPT

## 2021-06-01 NOTE — ASSESSMENT
[FreeTextEntry1] : Basic cardiovascular prevention measures are advised including regular exercise, surveillance medical examination, and prudent portion-controlled low fat diet, rich in a variety of vegetables with minimal added sugars, refined starches, and no artificially hydrogenated oils. encouraged to exercise more\par Discussion and interpretation of previous tests , external notes( labs, radiology, specialist  , patient verbalized understanding.\par Prescription drug management\par renew xanax\par  As per usual protocol the patient was advised in regards to the risks of driving when on medications with side effects of dizziness or drowsiness. Patient has been assessed  for increase risk for respiratory depression. Patient denies suicidal ideations or plan.  Istop checked.\par Labs drawn/ specimens obtained  in office on this date of service  for evaluation of   assessed conditions -  lipid     to be run at [facility].\par

## 2021-06-01 NOTE — HISTORY OF PRESENT ILLNESS
[FreeTextEntry1] : pt presents for monthly follow up [de-identified] : 56 yo wf here for follow up. \par There is more anxiety lately \par She had covid in October. Since then she has not felt 100%\par Her weight is a problem . she is on a 1200 tamar diet. I cant exercise. I only lost 8 pounds \par My feet hurt

## 2021-06-02 LAB
ALBUMIN SERPL ELPH-MCNC: 4.5 G/DL
ALP BLD-CCNC: 60 U/L
ALT SERPL-CCNC: 14 U/L
ANION GAP SERPL CALC-SCNC: 9 MMOL/L
AST SERPL-CCNC: 19 U/L
BASOPHILS # BLD AUTO: 0.05 K/UL
BASOPHILS NFR BLD AUTO: 0.8 %
BILIRUB SERPL-MCNC: 0.9 MG/DL
BUN SERPL-MCNC: 14 MG/DL
CALCIUM SERPL-MCNC: 9.9 MG/DL
CHLORIDE SERPL-SCNC: 105 MMOL/L
CHOLEST SERPL-MCNC: 142 MG/DL
CO2 SERPL-SCNC: 28 MMOL/L
COVID-19 NUCLEOCAPSID  GAM ANTIBODY INTERPRETATION: POSITIVE
CREAT SERPL-MCNC: 0.73 MG/DL
EOSINOPHIL # BLD AUTO: 0.21 K/UL
EOSINOPHIL NFR BLD AUTO: 3.3 %
GLUCOSE SERPL-MCNC: 82 MG/DL
HCT VFR BLD CALC: 45 %
HDLC SERPL-MCNC: 52 MG/DL
HGB BLD-MCNC: 14.3 G/DL
IMM GRANULOCYTES NFR BLD AUTO: 0.2 %
LDLC SERPL CALC-MCNC: 60 MG/DL
LYMPHOCYTES # BLD AUTO: 2.06 K/UL
LYMPHOCYTES NFR BLD AUTO: 32.2 %
MAN DIFF?: NORMAL
MCHC RBC-ENTMCNC: 28 PG
MCHC RBC-ENTMCNC: 31.8 GM/DL
MCV RBC AUTO: 88.1 FL
MONOCYTES # BLD AUTO: 0.35 K/UL
MONOCYTES NFR BLD AUTO: 5.5 %
NEUTROPHILS # BLD AUTO: 3.72 K/UL
NEUTROPHILS NFR BLD AUTO: 58 %
NONHDLC SERPL-MCNC: 90 MG/DL
PLATELET # BLD AUTO: 260 K/UL
POTASSIUM SERPL-SCNC: 4.2 MMOL/L
PROT SERPL-MCNC: 7.3 G/DL
RBC # BLD: 5.11 M/UL
RBC # FLD: 12.1 %
SARS-COV-2 AB SERPL QL IA: 160 INDEX
SODIUM SERPL-SCNC: 141 MMOL/L
TRIGL SERPL-MCNC: 149 MG/DL
WBC # FLD AUTO: 6.4 K/UL

## 2021-08-27 ENCOUNTER — APPOINTMENT (OUTPATIENT)
Dept: FAMILY MEDICINE | Facility: CLINIC | Age: 56
End: 2021-08-27
Payer: COMMERCIAL

## 2021-08-27 ENCOUNTER — TRANSCRIPTION ENCOUNTER (OUTPATIENT)
Age: 56
End: 2021-08-27

## 2021-08-27 VITALS
SYSTOLIC BLOOD PRESSURE: 132 MMHG | RESPIRATION RATE: 14 BRPM | OXYGEN SATURATION: 95 % | HEIGHT: 64 IN | DIASTOLIC BLOOD PRESSURE: 80 MMHG | WEIGHT: 172 LBS | BODY MASS INDEX: 29.37 KG/M2 | HEART RATE: 63 BPM

## 2021-08-27 VITALS — TEMPERATURE: 97.3 F

## 2021-08-27 PROCEDURE — 99213 OFFICE O/P EST LOW 20 MIN: CPT | Mod: 25

## 2021-08-27 PROCEDURE — 36415 COLL VENOUS BLD VENIPUNCTURE: CPT

## 2021-08-27 NOTE — ASSESSMENT
[FreeTextEntry1] : Basic cardiovascular prevention measures are advised including regular exercise, surveillance medical examination, and prudent portion-controlled low fat diet, rich in a variety of vegetables with minimal added sugars, refined starches, and no artificially hydrogenated oils.\par Discussion and interpretation of previous tests , external notes( labs, radiology, specialist  , patient verbalized understanding.\par Prescription drug management\par renew xanax\par  As per usual protocol the patient was advised in regards to the risks of driving when on medications with side effects of dizziness or drowsiness. Patient has been assessed  for increase risk for respiratory depression. Patient denies suicidal ideations or plan.  Istop checked.\par \par Labs drawn/ specimens obtained  in office on this date of service  for evaluation of   assessed conditions -  covid igg   to be run at [facility].\par

## 2021-08-27 NOTE — HISTORY OF PRESENT ILLNESS
[FreeTextEntry1] : patient presents for medication renewal\par  [de-identified] : 55 yo here for follow up\par my daughter  it was a beautiful day. \par she still cant taste or smell. \par I can taste sugar and salt. \par

## 2021-08-28 ENCOUNTER — TRANSCRIPTION ENCOUNTER (OUTPATIENT)
Age: 56
End: 2021-08-28

## 2021-08-28 LAB
COVID-19 NUCLEOCAPSID  GAM ANTIBODY INTERPRETATION: POSITIVE
SARS-COV-2 AB SERPL QL IA: 83.2 INDEX

## 2021-12-07 ENCOUNTER — APPOINTMENT (OUTPATIENT)
Dept: FAMILY MEDICINE | Facility: CLINIC | Age: 56
End: 2021-12-07
Payer: COMMERCIAL

## 2021-12-07 VITALS
DIASTOLIC BLOOD PRESSURE: 80 MMHG | OXYGEN SATURATION: 97 % | RESPIRATION RATE: 12 BRPM | HEIGHT: 64 IN | SYSTOLIC BLOOD PRESSURE: 120 MMHG | HEART RATE: 75 BPM

## 2021-12-07 VITALS — TEMPERATURE: 98.1 F

## 2021-12-07 PROCEDURE — 99213 OFFICE O/P EST LOW 20 MIN: CPT

## 2021-12-07 NOTE — HEALTH RISK ASSESSMENT
[Yes] : Yes [No] : In the past 12 months have you used drugs other than those required for medical reasons? No [] : No [Audit-CScore] : 0

## 2021-12-07 NOTE — ASSESSMENT
[FreeTextEntry1] : Basic cardiovascular prevention measures are advised including regular exercise, surveillance medical examination, and prudent portion-controlled low fat diet, rich in a variety of vegetables with minimal added sugars, refined starches, and no artificially hydrogenated oils.\par Discussion and interpretation of previous tests , external notes( labs, radiology, specialist  , patient verbalized understanding.\par Prescription drug management\par renew xanax\par  As per usual protocol the patient was advised in regards to the risks of driving when on medications with side effects of dizziness or drowsiness. Patient has been assessed  for increase risk for respiratory depression. Patient denies suicidal ideations or plan.  Istop checked.\par consider ssri or tricyclic  for sleep\par \par \par Labs to be drawn/ specimens obtained  at outside  lab    for evaluation of   assessed conditions - cbc cmp lipid    covid igg for physical and screening purposes.\par

## 2021-12-07 NOTE — HISTORY OF PRESENT ILLNESS
[FreeTextEntry1] : patient presents for medication renewal\par  [de-identified] : 57 yo here for follow up\par \par I would like antibodies checked. I take the xanax usually at night for sleep and sometimes during day for panic. I have anxiety. my back hurts. i cant exercise. i have gained weight. i am going to do weight watchers again. i am compliant w meds. \par \par my daughter  it was a beautiful day. \par she still cant taste or smell. \par I can taste sugar and salt. \par

## 2021-12-07 NOTE — REVIEW OF SYSTEMS
[Recent Change In Weight] : ~T recent weight change [Joint Pain] : joint pain [Back Pain] : back pain [Negative] : Genitourinary

## 2022-03-14 ENCOUNTER — APPOINTMENT (OUTPATIENT)
Dept: FAMILY MEDICINE | Facility: CLINIC | Age: 57
End: 2022-03-14
Payer: COMMERCIAL

## 2022-03-14 VITALS
DIASTOLIC BLOOD PRESSURE: 82 MMHG | HEIGHT: 64 IN | SYSTOLIC BLOOD PRESSURE: 128 MMHG | HEART RATE: 73 BPM | OXYGEN SATURATION: 98 % | WEIGHT: 201 LBS | RESPIRATION RATE: 12 BRPM | BODY MASS INDEX: 34.31 KG/M2

## 2022-03-14 VITALS — TEMPERATURE: 98 F

## 2022-03-14 DIAGNOSIS — K21.9 GASTRO-ESOPHAGEAL REFLUX DISEASE W/OUT ESOPHAGITIS: ICD-10-CM

## 2022-03-14 PROCEDURE — 36415 COLL VENOUS BLD VENIPUNCTURE: CPT

## 2022-03-14 PROCEDURE — 99214 OFFICE O/P EST MOD 30 MIN: CPT | Mod: 25

## 2022-03-14 RX ORDER — BETAMETHASONE DIPROPIONATE 0.5 MG/G
0.05 CREAM TOPICAL TWICE DAILY
Qty: 1 | Refills: 3 | Status: ACTIVE | COMMUNITY
Start: 2022-03-14 | End: 1900-01-01

## 2022-03-14 NOTE — ASSESSMENT
[FreeTextEntry1] : Basic cardiovascular prevention measures are advised including regular exercise, surveillance medical examination, and prudent portion-controlled low fat diet, rich in a variety of vegetables with minimal added sugars, refined starches, and no artificially hydrogenated oils.\par Discussion and interpretation of previous tests , external notes( labs, radiology, specialist  , patient verbalized understanding.\par Prescription drug management\par renew xanax and rosuvastatin\par throat lesion- try antibiotics\par if no better follow up ent\par hand infection - try cream for inflammation\par Labs drawn/ specimens obtained  in office on this date of service  for evaluation of   assessed conditions -     cbc cmp lipid to be run at [facility].\par

## 2022-03-14 NOTE — HISTORY OF PRESENT ILLNESS
[FreeTextEntry1] : patient presents for medication renewal  [de-identified] : 57 yo wf here for follow up\par she is stressed with work and with  workman's comp\par Her stomach is acting up it lasted 1 1/2 m to ease out. she started the prilosec  she only took it one a day.  now it is fine. she stopped the meds and got hunger pains - not a good stomach feeling\par I tried not eating fruit. \par I don’t drink alcohol\par I do drink coffee\par i don’t take advil\par \par co left hand bit by something and it itches\par \par co post nasal co left throat blistery irritation from the post nasal drip\par

## 2022-03-15 ENCOUNTER — TRANSCRIPTION ENCOUNTER (OUTPATIENT)
Age: 57
End: 2022-03-15

## 2022-03-15 LAB
ALBUMIN SERPL ELPH-MCNC: 4.6 G/DL
ALP BLD-CCNC: 66 U/L
ALT SERPL-CCNC: 24 U/L
ANION GAP SERPL CALC-SCNC: 10 MMOL/L
AST SERPL-CCNC: 17 U/L
BILIRUB SERPL-MCNC: 0.9 MG/DL
BUN SERPL-MCNC: 12 MG/DL
CALCIUM SERPL-MCNC: 9.3 MG/DL
CHLORIDE SERPL-SCNC: 106 MMOL/L
CHOLEST SERPL-MCNC: 212 MG/DL
CO2 SERPL-SCNC: 26 MMOL/L
CREAT SERPL-MCNC: 0.61 MG/DL
EGFR: 104 ML/MIN/1.73M2
GLUCOSE SERPL-MCNC: 90 MG/DL
HDLC SERPL-MCNC: 69 MG/DL
LDLC SERPL CALC-MCNC: 103 MG/DL
NONHDLC SERPL-MCNC: 142 MG/DL
POTASSIUM SERPL-SCNC: 4.5 MMOL/L
PROT SERPL-MCNC: 7 G/DL
SODIUM SERPL-SCNC: 143 MMOL/L
TRIGL SERPL-MCNC: 193 MG/DL

## 2022-03-17 ENCOUNTER — RX CHANGE (OUTPATIENT)
Age: 57
End: 2022-03-17

## 2022-03-22 ENCOUNTER — APPOINTMENT (OUTPATIENT)
Dept: FAMILY MEDICINE | Facility: CLINIC | Age: 57
End: 2022-03-22
Payer: COMMERCIAL

## 2022-03-22 PROCEDURE — 99442: CPT

## 2022-03-23 RX ORDER — SULFAMETHOXAZOLE AND TRIMETHOPRIM 800; 160 MG/1; MG/1
800-160 TABLET ORAL TWICE DAILY
Qty: 20 | Refills: 0 | Status: DISCONTINUED | COMMUNITY
Start: 2022-03-14 | End: 2022-03-23

## 2022-03-23 NOTE — ASSESSMENT
[FreeTextEntry1] : Please note this assessment was done using telemedicine as a result of social distancing due to the outbreak of COVID 19 .\par she is on bactrim for hand infection and got a fever\par covid home swab neg\par cxr zp ro pneumonia\par We spent sufficient time to discuss aspects of care; questions were answered  to patient's satisfaction.The diagnosis and care plan were discussed with patient in detail.  Patient test results were  reviewed and explained in full. All questions and concerns  were answered to the best of my knowledge.\par

## 2022-03-23 NOTE — HISTORY OF PRESENT ILLNESS
[Home] : at home, [unfilled] , at the time of the visit. [Medical Office: ([facility])___] : at the medical office located in  [Verbal consent obtained from patient] : the patient, [unfilled] [FreeTextEntry8] : Patient initiated communication for concern via HIPAA secure platform  (Telemedicine )  for        cough              using phone              .Reviewed quarantine instructions and self isolation to limit spread of disease  as per EDIE guidelines.  Patient is an established patient and has verbalized consent to be treated via telemedicine .\par she had a headache all week. Sat it got worse  was sick over the weekend. she got a fever monday 102.2  she had the shakes she had blankets on her. she kept taking tylenol. she had headache and fever and yesterday she was tired the fever stayed  \par it has been normal without any tylenol  since  tuesday 2 pm. , now if she takes a deep breath she gets twinges of pain  pain is not constant and she gets a cough when she takes a deep breath. \par \par she is on bactrim started it 3/14/22

## 2022-04-25 ENCOUNTER — TRANSCRIPTION ENCOUNTER (OUTPATIENT)
Age: 57
End: 2022-04-25

## 2022-06-28 ENCOUNTER — APPOINTMENT (OUTPATIENT)
Dept: FAMILY MEDICINE | Facility: CLINIC | Age: 57
End: 2022-06-28
Payer: COMMERCIAL

## 2022-06-28 VITALS
WEIGHT: 189 LBS | BODY MASS INDEX: 32.27 KG/M2 | HEART RATE: 74 BPM | RESPIRATION RATE: 12 BRPM | HEIGHT: 64 IN | OXYGEN SATURATION: 99 % | SYSTOLIC BLOOD PRESSURE: 132 MMHG | DIASTOLIC BLOOD PRESSURE: 84 MMHG

## 2022-06-28 DIAGNOSIS — Z87.898 PERSONAL HISTORY OF OTHER SPECIFIED CONDITIONS: ICD-10-CM

## 2022-06-28 LAB
ALBUMIN SERPL ELPH-MCNC: 4.6 G/DL
ALP BLD-CCNC: 62 U/L
ALT SERPL-CCNC: 16 U/L
ANION GAP SERPL CALC-SCNC: 11 MMOL/L
AST SERPL-CCNC: 17 U/L
BILIRUB SERPL-MCNC: 1.3 MG/DL
BUN SERPL-MCNC: 12 MG/DL
CALCIUM SERPL-MCNC: 9.6 MG/DL
CHLORIDE SERPL-SCNC: 103 MMOL/L
CHOLEST SERPL-MCNC: 165 MG/DL
CO2 SERPL-SCNC: 25 MMOL/L
CREAT SERPL-MCNC: 0.72 MG/DL
EGFR: 96 ML/MIN/1.73M2
GLUCOSE SERPL-MCNC: 87 MG/DL
HDLC SERPL-MCNC: 59 MG/DL
LDLC SERPL CALC-MCNC: 75 MG/DL
NONHDLC SERPL-MCNC: 105 MG/DL
POTASSIUM SERPL-SCNC: 4.5 MMOL/L
PROT SERPL-MCNC: 7.2 G/DL
SODIUM SERPL-SCNC: 139 MMOL/L
TRIGL SERPL-MCNC: 152 MG/DL

## 2022-06-28 PROCEDURE — 99213 OFFICE O/P EST LOW 20 MIN: CPT | Mod: 25

## 2022-06-28 PROCEDURE — 36415 COLL VENOUS BLD VENIPUNCTURE: CPT

## 2022-06-28 NOTE — REVIEW OF SYSTEMS
[Abdominal Pain] : abdominal pain [Negative] : Cardiovascular [FreeTextEntry4] : eye lids flaky and red

## 2022-06-28 NOTE — PHYSICAL EXAM
[Normal] : normal rate, regular rhythm, normal S1 and S2 and no murmur heard [de-identified] : blepharitis

## 2022-06-28 NOTE — HISTORY OF PRESENT ILLNESS
[FreeTextEntry1] : patient presents for medication renewal  [de-identified] : 56 yo wf here for follow up\par she has lost 12 pounds. \par she is walking drinking water. \par i have a rash on my eyes\par i tried to have endoscopy\par \par 3/14/22\par she is stressed with work and with  workman's comp\par Her stomach is acting up it lasted 1 1/2 m to ease out. she started the prilosec  she only took it one a day.  now it is fine. she stopped the meds and got hunger pains - not a good stomach feeling\par I tried not eating fruit. \par I don’t drink alcohol\par I do drink coffee\par i don’t take advil\par \par co left hand bit by something and it itches\par \par co post nasal co left throat blistery irritation from the post nasal drip\par

## 2022-06-28 NOTE — ASSESSMENT
[FreeTextEntry1] : Basic cardiovascular prevention measures are advised including regular exercise, surveillance medical examination, and prudent portion-controlled low fat diet, rich in a variety of vegetables with minimal added sugars, refined starches, and no artificially hydrogenated oils.\par Discussion and interpretation of previous tests , external notes( labs, radiology, specialist  , patient verbalized understanding.\par Prescription drug management\par renew xanax  \par  \par  \par Labs drawn/ specimens obtained  in office on this date of service  for evaluation of   assessed conditions -     cbc cmp lipid to be run at [facility].\par follow up eye dr\par follow up gastro

## 2022-06-29 ENCOUNTER — TRANSCRIPTION ENCOUNTER (OUTPATIENT)
Age: 57
End: 2022-06-29

## 2022-06-30 ENCOUNTER — APPOINTMENT (OUTPATIENT)
Dept: OPHTHALMOLOGY | Facility: CLINIC | Age: 57
End: 2022-06-30

## 2022-06-30 ENCOUNTER — NON-APPOINTMENT (OUTPATIENT)
Age: 57
End: 2022-06-30

## 2022-06-30 PROCEDURE — 99203 OFFICE O/P NEW LOW 30 MIN: CPT

## 2022-09-14 ENCOUNTER — APPOINTMENT (OUTPATIENT)
Dept: FAMILY MEDICINE | Facility: CLINIC | Age: 57
End: 2022-09-14

## 2022-09-14 ENCOUNTER — NON-APPOINTMENT (OUTPATIENT)
Age: 57
End: 2022-09-14

## 2022-09-14 VITALS
SYSTOLIC BLOOD PRESSURE: 110 MMHG | HEART RATE: 88 BPM | HEIGHT: 64 IN | TEMPERATURE: 97.9 F | RESPIRATION RATE: 14 BRPM | DIASTOLIC BLOOD PRESSURE: 82 MMHG | OXYGEN SATURATION: 99 % | WEIGHT: 184 LBS | BODY MASS INDEX: 31.41 KG/M2

## 2022-09-14 DIAGNOSIS — H53.9 UNSPECIFIED VISUAL DISTURBANCE: ICD-10-CM

## 2022-09-14 DIAGNOSIS — Z87.891 PERSONAL HISTORY OF NICOTINE DEPENDENCE: ICD-10-CM

## 2022-09-14 DIAGNOSIS — K08.129: ICD-10-CM

## 2022-09-14 DIAGNOSIS — Z80.1 FAMILY HISTORY OF MALIGNANT NEOPLASM OF TRACHEA, BRONCHUS AND LUNG: ICD-10-CM

## 2022-09-14 DIAGNOSIS — L98.9 DISORDER OF THE SKIN AND SUBCUTANEOUS TISSUE, UNSPECIFIED: ICD-10-CM

## 2022-09-14 DIAGNOSIS — J39.2 OTHER DISEASES OF PHARYNX: ICD-10-CM

## 2022-09-14 DIAGNOSIS — R79.89 OTHER SPECIFIED ABNORMAL FINDINGS OF BLOOD CHEMISTRY: ICD-10-CM

## 2022-09-14 DIAGNOSIS — L08.9 LOCAL INFECTION OF THE SKIN AND SUBCUTANEOUS TISSUE, UNSPECIFIED: ICD-10-CM

## 2022-09-14 PROCEDURE — 36415 COLL VENOUS BLD VENIPUNCTURE: CPT

## 2022-09-14 PROCEDURE — 93000 ELECTROCARDIOGRAM COMPLETE: CPT

## 2022-09-14 PROCEDURE — 99214 OFFICE O/P EST MOD 30 MIN: CPT | Mod: 25

## 2022-09-15 ENCOUNTER — TRANSCRIPTION ENCOUNTER (OUTPATIENT)
Age: 57
End: 2022-09-15

## 2022-09-15 PROBLEM — R79.89 LOW VITAMIN D LEVEL: Status: ACTIVE | Noted: 2022-09-15

## 2022-09-15 LAB
25(OH)D3 SERPL-MCNC: 17.4 NG/ML
BASOPHILS # BLD AUTO: 0.03 K/UL
BASOPHILS NFR BLD AUTO: 0.4 %
EOSINOPHIL # BLD AUTO: 0.26 K/UL
EOSINOPHIL NFR BLD AUTO: 3.2 %
HCT VFR BLD CALC: 45 %
HGB BLD-MCNC: 14.5 G/DL
IMM GRANULOCYTES NFR BLD AUTO: 0.2 %
LYMPHOCYTES # BLD AUTO: 2.33 K/UL
LYMPHOCYTES NFR BLD AUTO: 28.2 %
MAN DIFF?: NORMAL
MCHC RBC-ENTMCNC: 28.2 PG
MCHC RBC-ENTMCNC: 32.2 GM/DL
MCV RBC AUTO: 87.5 FL
MONOCYTES # BLD AUTO: 0.53 K/UL
MONOCYTES NFR BLD AUTO: 6.4 %
NEUTROPHILS # BLD AUTO: 5.08 K/UL
NEUTROPHILS NFR BLD AUTO: 61.6 %
PLATELET # BLD AUTO: 290 K/UL
RBC # BLD: 5.14 M/UL
RBC # FLD: 11.7 %
WBC # FLD AUTO: 8.25 K/UL

## 2022-09-15 NOTE — CONSULT LETTER
[Dear  ___] : Dear  [unfilled], [Consult Closing:] : Thank you very much for allowing me to participate in the care of this patient.  If you have any questions, please do not hesitate to contact me. [FreeTextEntry1] : patient medically

## 2022-09-15 NOTE — ASSESSMENT
[High Risk Surgery - Intraperitoneal, Intrathoracic or Supringuinal Vascular Procedures] : High Risk Surgery - Intraperitoneal, Intrathoracic or Supringuinal Vascular Procedures - No (0) [Ischemic Heart Disease] : Ischemic Heart Disease - No (0) [Congestive Heart Failure] : Congestive Heart Failure - No (0) [Prior Cerebrovascular Accident or TIA] : Prior Cerebrovascular Accident or TIA - No (0) [Creatinine >= 2mg/dL (1 Point)] : Creatinine >= 2mg/dL - No (0) [Insulin-dependent Diabetic (1 Point)] : Insulin-dependent Diabetic - No (0) [0] : 0 , RCRI Class: I, Risk of Post-Op Cardiac Complications: 3.9%, 95% CI for Risk Estimate: 2.8% - 5.4% [No Further Testing Recommended] : no further testing recommended [Continue medications as is] : Continue current medications [As per surgery] : as per surgery [Patient Optimized for Surgery] : Patient optimized for surgery [FreeTextEntry4] : patient medicallly cleared\par Vit D Low-I added vit D 55275 iu weekly

## 2022-09-15 NOTE — PHYSICAL EXAM
[No Acute Distress] : no acute distress [Well Nourished] : well nourished [Well Developed] : well developed [Well-Appearing] : well-appearing [Normal Sclera/Conjunctiva] : normal sclera/conjunctiva [PERRL] : pupils equal round and reactive to light [EOMI] : extraocular movements intact [Normal Outer Ear/Nose] : the outer ears and nose were normal in appearance [No JVD] : no jugular venous distention [No Lymphadenopathy] : no lymphadenopathy [Supple] : supple [Thyroid Normal, No Nodules] : the thyroid was normal and there were no nodules present [No Respiratory Distress] : no respiratory distress  [No Accessory Muscle Use] : no accessory muscle use [Clear to Auscultation] : lungs were clear to auscultation bilaterally [Normal Rate] : normal rate  [Regular Rhythm] : with a regular rhythm [Normal S1, S2] : normal S1 and S2 [No Murmur] : no murmur heard [No Carotid Bruits] : no carotid bruits [No Abdominal Bruit] : a ~M bruit was not heard ~T in the abdomen [No Varicosities] : no varicosities [Pedal Pulses Present] : the pedal pulses are present [No Edema] : there was no peripheral edema [No Palpable Aorta] : no palpable aorta [No Extremity Clubbing/Cyanosis] : no extremity clubbing/cyanosis [Soft] : abdomen soft [Non Tender] : non-tender [Non-distended] : non-distended [No Masses] : no abdominal mass palpated [No HSM] : no HSM [Normal Bowel Sounds] : normal bowel sounds [Normal Posterior Cervical Nodes] : no posterior cervical lymphadenopathy [Normal Anterior Cervical Nodes] : no anterior cervical lymphadenopathy [No CVA Tenderness] : no CVA  tenderness [No Spinal Tenderness] : no spinal tenderness [No Joint Swelling] : no joint swelling [Grossly Normal Strength/Tone] : grossly normal strength/tone [No Rash] : no rash [Coordination Grossly Intact] : coordination grossly intact [No Focal Deficits] : no focal deficits [Normal Gait] : normal gait [Deep Tendon Reflexes (DTR)] : deep tendon reflexes were 2+ and symmetric [Normal Affect] : the affect was normal [Normal Insight/Judgement] : insight and judgment were intact [de-identified] : loose teeth

## 2022-09-15 NOTE — HISTORY OF PRESENT ILLNESS
[No Pertinent Cardiac History] : no history of aortic stenosis, atrial fibrillation, coronary artery disease, recent myocardial infarction, or implantable device/pacemaker [No Pertinent Pulmonary History] : no history of asthma, COPD, sleep apnea, or smoking [No Adverse Anesthesia Reaction] : no adverse anesthesia reaction in self or family member [(Patient denies any chest pain, claudication, dyspnea on exertion, orthopnea, palpitations or syncope)] : Patient denies any chest pain, claudication, dyspnea on exertion, orthopnea, palpitations or syncope [Chronic Anticoagulation] : no chronic anticoagulation [Chronic Kidney Disease] : no chronic kidney disease [Diabetes] : no diabetes [FreeTextEntry1] : dental implants [FreeTextEntry2] : 9/22/2022 [FreeTextEntry3] : Dr Malachi Quiroga  [FreeTextEntry4] : Patient presents for medical clearance having dental implants put in at [facility]

## 2022-09-15 NOTE — RESULTS/DATA
[] : results reviewed [ECG Reviewed] : reviewed [Normal] : The 12 - lead ECG is normal [NSR] : normal sinus rhythm [Ventricular Rate___] : ventricular rate is [unfilled] beats per minute [P Waves Normal] : the P wave is normal [Normal QRS] : the QRS is normal [Normal ST Segments] : the ST segments are normal [de-identified] : normal [de-identified] : vit D 17.4

## 2022-09-28 ENCOUNTER — APPOINTMENT (OUTPATIENT)
Dept: FAMILY MEDICINE | Facility: CLINIC | Age: 57
End: 2022-09-28

## 2022-10-27 ENCOUNTER — APPOINTMENT (OUTPATIENT)
Dept: OPHTHALMOLOGY | Facility: CLINIC | Age: 57
End: 2022-10-27

## 2022-12-23 ENCOUNTER — APPOINTMENT (OUTPATIENT)
Dept: FAMILY MEDICINE | Facility: CLINIC | Age: 57
End: 2022-12-23

## 2022-12-23 VITALS
OXYGEN SATURATION: 98 % | DIASTOLIC BLOOD PRESSURE: 84 MMHG | RESPIRATION RATE: 14 BRPM | SYSTOLIC BLOOD PRESSURE: 130 MMHG | HEART RATE: 90 BPM | WEIGHT: 172 LBS | HEIGHT: 64 IN | BODY MASS INDEX: 29.37 KG/M2

## 2022-12-23 DIAGNOSIS — Z01.818 ENCOUNTER FOR OTHER PREPROCEDURAL EXAMINATION: ICD-10-CM

## 2022-12-23 DIAGNOSIS — R09.82 POSTNASAL DRIP: ICD-10-CM

## 2022-12-23 PROCEDURE — 36415 COLL VENOUS BLD VENIPUNCTURE: CPT

## 2022-12-23 PROCEDURE — 99213 OFFICE O/P EST LOW 20 MIN: CPT | Mod: 25

## 2022-12-23 NOTE — HISTORY OF PRESENT ILLNESS
[FreeTextEntry1] : Patient presents for a medication renewal [de-identified] : 58 yo wf here for follow up \par the surgery went well with the dental implants I was in a lot of pain . I took ibuprofen. I used more xanax than normal since the surgery\par I have a post nasal drip for 2 weeks\par

## 2022-12-23 NOTE — PHYSICAL EXAM
[Normal] : normal rate, regular rhythm, normal S1 and S2 and no murmur heard [de-identified] : no pnd seen

## 2022-12-23 NOTE — ASSESSMENT
[FreeTextEntry1] : Basic cardiovascular prevention measures are advised including regular exercise, surveillance medical examination, and prudent portion-controlled low fat diet, rich in a variety of vegetables with minimal added sugars, refined starches, and no artificially hydrogenated oils.\par Discussion and interpretation of previous tests , external notes( labs, radiology, specialist  , patient verbalized understanding.\par Labs drawn/ specimens obtained  in office on this date of service  for evaluation of   assessed conditions -  lipid    to be run at [facility].\par \par Prescription drug management\par renew xanax\par  As per usual protocol the patient was advised in regards to the risks of driving when on medications with side effects of dizziness or drowsiness. Patient has been assessed  for increase risk for respiratory depression. Patient denies suicidal ideations or plan.  Istop checked.\par covid resp swab\par she does not want nasal SPRAY

## 2022-12-24 ENCOUNTER — TRANSCRIPTION ENCOUNTER (OUTPATIENT)
Age: 57
End: 2022-12-24

## 2022-12-24 LAB
ALBUMIN SERPL ELPH-MCNC: 4.8 G/DL
ALP BLD-CCNC: 60 U/L
ALT SERPL-CCNC: 14 U/L
ANION GAP SERPL CALC-SCNC: 11 MMOL/L
AST SERPL-CCNC: 18 U/L
BASOPHILS # BLD AUTO: 0.03 K/UL
BASOPHILS NFR BLD AUTO: 0.5 %
BILIRUB SERPL-MCNC: 1.8 MG/DL
BUN SERPL-MCNC: 9 MG/DL
CALCIUM SERPL-MCNC: 9.8 MG/DL
CHLORIDE SERPL-SCNC: 102 MMOL/L
CHOLEST SERPL-MCNC: 156 MG/DL
CO2 SERPL-SCNC: 26 MMOL/L
CREAT SERPL-MCNC: 0.7 MG/DL
EGFR: 100 ML/MIN/1.73M2
EOSINOPHIL # BLD AUTO: 0.13 K/UL
EOSINOPHIL NFR BLD AUTO: 2.3 %
GLUCOSE SERPL-MCNC: 104 MG/DL
HCT VFR BLD CALC: 42.1 %
HDLC SERPL-MCNC: 61 MG/DL
HGB BLD-MCNC: 14 G/DL
IMM GRANULOCYTES NFR BLD AUTO: 0.2 %
LDLC SERPL CALC-MCNC: 72 MG/DL
LYMPHOCYTES # BLD AUTO: 0.84 K/UL
LYMPHOCYTES NFR BLD AUTO: 14.6 %
MAN DIFF?: NORMAL
MCHC RBC-ENTMCNC: 28.3 PG
MCHC RBC-ENTMCNC: 33.3 GM/DL
MCV RBC AUTO: 85.2 FL
MONOCYTES # BLD AUTO: 0.37 K/UL
MONOCYTES NFR BLD AUTO: 6.4 %
NEUTROPHILS # BLD AUTO: 4.38 K/UL
NEUTROPHILS NFR BLD AUTO: 76 %
NONHDLC SERPL-MCNC: 95 MG/DL
PLATELET # BLD AUTO: 204 K/UL
POTASSIUM SERPL-SCNC: 3.9 MMOL/L
PROT SERPL-MCNC: 7.1 G/DL
RBC # BLD: 4.94 M/UL
RBC # FLD: 11.8 %
S PYO DNA THROAT QL NAA+PROBE: NOT DETECTED
SODIUM SERPL-SCNC: 139 MMOL/L
TRIGL SERPL-MCNC: 116 MG/DL
WBC # FLD AUTO: 5.76 K/UL

## 2022-12-25 ENCOUNTER — TRANSCRIPTION ENCOUNTER (OUTPATIENT)
Age: 57
End: 2022-12-25

## 2022-12-25 LAB
INFLUENZA A RESULT: NOT DETECTED
INFLUENZA B RESULT: NOT DETECTED
RESP SYN VIRUS RESULT: NOT DETECTED
SARS-COV-2 RESULT: DETECTED

## 2022-12-27 ENCOUNTER — TRANSCRIPTION ENCOUNTER (OUTPATIENT)
Age: 57
End: 2022-12-27

## 2023-03-29 ENCOUNTER — APPOINTMENT (OUTPATIENT)
Dept: FAMILY MEDICINE | Facility: CLINIC | Age: 58
End: 2023-03-29
Payer: COMMERCIAL

## 2023-03-29 VITALS
DIASTOLIC BLOOD PRESSURE: 70 MMHG | SYSTOLIC BLOOD PRESSURE: 120 MMHG | OXYGEN SATURATION: 96 % | BODY MASS INDEX: 27.83 KG/M2 | RESPIRATION RATE: 14 BRPM | HEIGHT: 64 IN | WEIGHT: 163 LBS | HEART RATE: 77 BPM

## 2023-03-29 PROCEDURE — 36415 COLL VENOUS BLD VENIPUNCTURE: CPT

## 2023-03-29 PROCEDURE — 99213 OFFICE O/P EST LOW 20 MIN: CPT | Mod: 25

## 2023-03-29 RX ORDER — NIRMATRELVIR AND RITONAVIR 300-100 MG
20 X 150 MG & KIT ORAL
Qty: 30 | Refills: 0 | Status: COMPLETED | COMMUNITY
Start: 2022-12-25 | End: 2023-03-29

## 2023-03-29 NOTE — ASSESSMENT
[FreeTextEntry1] : Basic cardiovascular prevention measures are advised including regular exercise, surveillance medical examination, and prudent portion-controlled low fat diet, rich in a variety of vegetables with minimal added sugars, refined starches, and no artificially hydrogenated oils.\par Discussion and interpretation of previous tests , external notes( labs, radiology, specialist  , patient verbalized understanding.\par Labs drawn/ specimens obtained  in office on this date of service  for evaluation of   assessed conditions -  lipid    to be run at [facility].\par \par Prescription drug management\par renew xanax\par  As per usual protocol the patient was advised in regards to the risks of driving when on medications with side effects of dizziness or drowsiness. Patient has been assessed  for increase risk for respiratory depression. Patient denies suicidal ideations or plan.  Istop checked.\par

## 2023-03-29 NOTE — HISTORY OF PRESENT ILLNESS
[FreeTextEntry1] : Patient presents for a medication renewal [de-identified] : 56 yo wf here for follow up  she had woozy spells - ? covid . she checked her bp and it was 160/80's  it came down . her bp is 134-38/79-85  no cp sob . she is loving her new teeth. implants. she is so happy. \par 12/24/22 + covid \par 12/23/22\par the surgery went well with the dental implants I was in a lot of pain . I took ibuprofen. I used more xanax than normal since the surgery\par I have a post nasal drip for 2 weeks\par

## 2023-03-30 ENCOUNTER — TRANSCRIPTION ENCOUNTER (OUTPATIENT)
Age: 58
End: 2023-03-30

## 2023-03-30 LAB
ALBUMIN SERPL ELPH-MCNC: 4.7 G/DL
ALP BLD-CCNC: 54 U/L
ALT SERPL-CCNC: 18 U/L
ANION GAP SERPL CALC-SCNC: 11 MMOL/L
AST SERPL-CCNC: 17 U/L
BASOPHILS # BLD AUTO: 0.03 K/UL
BASOPHILS NFR BLD AUTO: 0.7 %
BILIRUB SERPL-MCNC: 1.3 MG/DL
BUN SERPL-MCNC: 10 MG/DL
CALCIUM SERPL-MCNC: 9.5 MG/DL
CHLORIDE SERPL-SCNC: 107 MMOL/L
CHOLEST SERPL-MCNC: 140 MG/DL
CO2 SERPL-SCNC: 27 MMOL/L
CREAT SERPL-MCNC: 0.69 MG/DL
EGFR: 100 ML/MIN/1.73M2
EOSINOPHIL # BLD AUTO: 0.16 K/UL
EOSINOPHIL NFR BLD AUTO: 3.5 %
GLUCOSE SERPL-MCNC: 95 MG/DL
HCT VFR BLD CALC: 42.6 %
HDLC SERPL-MCNC: 51 MG/DL
HGB BLD-MCNC: 13.8 G/DL
IMM GRANULOCYTES NFR BLD AUTO: 0.2 %
LDLC SERPL CALC-MCNC: 66 MG/DL
LYMPHOCYTES # BLD AUTO: 1.84 K/UL
LYMPHOCYTES NFR BLD AUTO: 39.9 %
MAN DIFF?: NORMAL
MCHC RBC-ENTMCNC: 28.3 PG
MCHC RBC-ENTMCNC: 32.4 GM/DL
MCV RBC AUTO: 87.3 FL
MONOCYTES # BLD AUTO: 0.29 K/UL
MONOCYTES NFR BLD AUTO: 6.3 %
NEUTROPHILS # BLD AUTO: 2.28 K/UL
NEUTROPHILS NFR BLD AUTO: 49.4 %
NONHDLC SERPL-MCNC: 89 MG/DL
PLATELET # BLD AUTO: 256 K/UL
POTASSIUM SERPL-SCNC: 4.1 MMOL/L
PROT SERPL-MCNC: 6.5 G/DL
RBC # BLD: 4.88 M/UL
RBC # FLD: 11.9 %
SODIUM SERPL-SCNC: 144 MMOL/L
TRIGL SERPL-MCNC: 112 MG/DL
WBC # FLD AUTO: 4.61 K/UL

## 2023-04-21 ENCOUNTER — RX CHANGE (OUTPATIENT)
Age: 58
End: 2023-04-21

## 2023-04-21 RX ORDER — OMEPRAZOLE 40 MG/1
40 CAPSULE, DELAYED RELEASE ORAL
Qty: 30 | Refills: 11 | Status: DISCONTINUED | COMMUNITY
Start: 2018-02-07 | End: 2023-04-21

## 2023-06-01 NOTE — HEALTH RISK ASSESSMENT
well developed, well nourished , in no acute distress , ambulating without difficulty , normal communication ability [No falls in past year] : Patient reported no falls in the past year [] : No [de-identified] : walk [de-identified] : weight watchers

## 2023-07-06 ENCOUNTER — LABORATORY RESULT (OUTPATIENT)
Age: 58
End: 2023-07-06

## 2023-07-06 ENCOUNTER — APPOINTMENT (OUTPATIENT)
Dept: FAMILY MEDICINE | Facility: CLINIC | Age: 58
End: 2023-07-06
Payer: COMMERCIAL

## 2023-07-06 VITALS
RESPIRATION RATE: 16 BRPM | DIASTOLIC BLOOD PRESSURE: 84 MMHG | SYSTOLIC BLOOD PRESSURE: 132 MMHG | HEART RATE: 93 BPM | BODY MASS INDEX: 27.66 KG/M2 | HEIGHT: 64 IN | WEIGHT: 162 LBS

## 2023-07-06 DIAGNOSIS — U07.1 COVID-19: ICD-10-CM

## 2023-07-06 DIAGNOSIS — Z12.39 ENCOUNTER FOR OTHER SCREENING FOR MALIGNANT NEOPLASM OF BREAST: ICD-10-CM

## 2023-07-06 PROCEDURE — 99213 OFFICE O/P EST LOW 20 MIN: CPT | Mod: 25

## 2023-07-06 PROCEDURE — 36415 COLL VENOUS BLD VENIPUNCTURE: CPT

## 2023-07-06 RX ORDER — ERGOCALCIFEROL 1.25 MG/1
1.25 MG CAPSULE, LIQUID FILLED ORAL
Qty: 12 | Refills: 4 | Status: ACTIVE | COMMUNITY
Start: 2022-09-15 | End: 1900-01-01

## 2023-07-06 NOTE — HISTORY OF PRESENT ILLNESS
[FreeTextEntry1] : Patient presents for a medication renewal [de-identified] : 57 yo wf here for follow up \par I am worried about my dog- he has a limp. \par she is not feeling woozy . \par Miya had her baby. \par 3/29/23\par  she had woozy spells - ? covid . she checked her bp and it was 160/80's  it came down . her bp is 134-38/79-85  no cp sob . she is loving her new teeth. implants. she is so happy. \par 12/24/22 + covid \par 12/23/22\par the surgery went well with the dental implants I was in a lot of pain . I took ibuprofen. I used more xanax than normal since the surgery\par I have a post nasal drip for 2 weeks\par

## 2023-07-07 LAB
ALBUMIN SERPL ELPH-MCNC: 4.9 G/DL
ALP BLD-CCNC: 64 U/L
ALT SERPL-CCNC: 18 U/L
ANION GAP SERPL CALC-SCNC: 12 MMOL/L
AST SERPL-CCNC: 18 U/L
BILIRUB SERPL-MCNC: 1.1 MG/DL
BUN SERPL-MCNC: 16 MG/DL
CALCIUM SERPL-MCNC: 10.2 MG/DL
CHLORIDE SERPL-SCNC: 104 MMOL/L
CHOLEST SERPL-MCNC: 165 MG/DL
CO2 SERPL-SCNC: 27 MMOL/L
CREAT SERPL-MCNC: 0.75 MG/DL
EGFR: 91 ML/MIN/1.73M2
GLUCOSE SERPL-MCNC: 91 MG/DL
HDLC SERPL-MCNC: 65 MG/DL
LDLC SERPL CALC-MCNC: 75 MG/DL
NONHDLC SERPL-MCNC: 100 MG/DL
POTASSIUM SERPL-SCNC: 4.8 MMOL/L
PROT SERPL-MCNC: 7.4 G/DL
SODIUM SERPL-SCNC: 142 MMOL/L
TRIGL SERPL-MCNC: 127 MG/DL

## 2023-07-13 ENCOUNTER — TRANSCRIPTION ENCOUNTER (OUTPATIENT)
Age: 58
End: 2023-07-13

## 2023-10-17 ENCOUNTER — APPOINTMENT (OUTPATIENT)
Dept: FAMILY MEDICINE | Facility: CLINIC | Age: 58
End: 2023-10-17
Payer: COMMERCIAL

## 2023-10-17 VITALS
RESPIRATION RATE: 16 BRPM | SYSTOLIC BLOOD PRESSURE: 120 MMHG | TEMPERATURE: 98.8 F | WEIGHT: 163 LBS | BODY MASS INDEX: 27.83 KG/M2 | DIASTOLIC BLOOD PRESSURE: 78 MMHG | HEART RATE: 93 BPM | HEIGHT: 64 IN | OXYGEN SATURATION: 96 %

## 2023-10-17 PROCEDURE — 99213 OFFICE O/P EST LOW 20 MIN: CPT

## 2023-12-14 ENCOUNTER — RX CHANGE (OUTPATIENT)
Age: 58
End: 2023-12-14

## 2023-12-14 RX ORDER — OMEPRAZOLE 40 MG/1
40 CAPSULE, DELAYED RELEASE ORAL
Qty: 90 | Refills: 3 | Status: DISCONTINUED | COMMUNITY
Start: 2023-04-21 | End: 2023-12-14

## 2024-01-15 ENCOUNTER — RX CHANGE (OUTPATIENT)
Age: 59
End: 2024-01-15

## 2024-01-15 RX ORDER — ROSUVASTATIN CALCIUM 10 MG/1
10 TABLET, FILM COATED ORAL
Qty: 90 | Refills: 3 | Status: ACTIVE | COMMUNITY
Start: 2024-01-15 | End: 1900-01-01

## 2024-01-15 RX ORDER — ROSUVASTATIN CALCIUM 10 MG/1
10 TABLET, FILM COATED ORAL
Qty: 90 | Refills: 3 | Status: DISCONTINUED | COMMUNITY
Start: 2019-01-08 | End: 2024-01-15

## 2024-02-01 ENCOUNTER — APPOINTMENT (OUTPATIENT)
Dept: FAMILY MEDICINE | Facility: CLINIC | Age: 59
End: 2024-02-01
Payer: COMMERCIAL

## 2024-02-01 VITALS
OXYGEN SATURATION: 97 % | WEIGHT: 165 LBS | HEART RATE: 87 BPM | SYSTOLIC BLOOD PRESSURE: 118 MMHG | BODY MASS INDEX: 28.32 KG/M2 | RESPIRATION RATE: 14 BRPM | DIASTOLIC BLOOD PRESSURE: 70 MMHG

## 2024-02-01 PROCEDURE — 99213 OFFICE O/P EST LOW 20 MIN: CPT

## 2024-02-01 NOTE — HISTORY OF PRESENT ILLNESS
[FreeTextEntry1] : Patient presents for a medication renewal [de-identified] : 59 yo wf here for follow up  I watch the baby   no cp sob.  10/17/23 just got bad news her friends  .  23 I am worried about my dog- he has a limp.    Miya had her baby.  3/29/23  she had woozy spells - ? covid . she checked her bp and it was 160/80's  it came down . her bp is 134-38/79-85  no cp sob . she is loving her new teeth. implants. she is so happy.  22 + covid  22 the surgery went well with the dental implants I was in a lot of pain . I took ibuprofen. I used more xanax than normal since the surgery I have a post nasal drip for 2 weeks

## 2024-02-01 NOTE — ASSESSMENT
[FreeTextEntry1] : Basic cardiovascular prevention measures are advised including regular exercise, surveillance medical examination, and prudent portion-controlled low fat diet, rich in a variety of vegetables with minimal added sugars, refined starches, and no artificially hydrogenated oils. Discussion and interpretation of previous tests , external notes( labs, radiology, specialist  , patient verbalized understanding. Labs drawn/ specimens obtained  in office on this date of service  for evaluation of   assessed conditions -lipid-      to be run at [facility]. Prescription drug management renew xanax .5 mg qid 120 istop checked.  As per usual protocol the patient was advised in regard to the risks of driving when on medications with side effects of dizziness or drowsiness. Patient has been assessed for increased risk for respiratory depression. Patient denies suicidal ideations or plan.  Istop checked.

## 2024-02-11 ENCOUNTER — RX CHANGE (OUTPATIENT)
Age: 59
End: 2024-02-11

## 2024-02-11 RX ORDER — OMEPRAZOLE 40 MG/1
40 CAPSULE, DELAYED RELEASE ORAL
Qty: 90 | Refills: 3 | Status: DISCONTINUED | COMMUNITY
Start: 2023-12-14 | End: 2024-02-11

## 2024-02-13 ENCOUNTER — RX CHANGE (OUTPATIENT)
Age: 59
End: 2024-02-13

## 2024-04-29 ENCOUNTER — APPOINTMENT (OUTPATIENT)
Dept: FAMILY MEDICINE | Facility: CLINIC | Age: 59
End: 2024-04-29
Payer: COMMERCIAL

## 2024-04-29 VITALS
SYSTOLIC BLOOD PRESSURE: 130 MMHG | DIASTOLIC BLOOD PRESSURE: 90 MMHG | BODY MASS INDEX: 30.56 KG/M2 | RESPIRATION RATE: 14 BRPM | HEART RATE: 66 BPM | TEMPERATURE: 97.9 F | WEIGHT: 179 LBS | OXYGEN SATURATION: 100 % | HEIGHT: 64 IN

## 2024-04-29 DIAGNOSIS — E78.5 HYPERLIPIDEMIA, UNSPECIFIED: ICD-10-CM

## 2024-04-29 DIAGNOSIS — F41.9 ANXIETY DISORDER, UNSPECIFIED: ICD-10-CM

## 2024-04-29 PROCEDURE — 99213 OFFICE O/P EST LOW 20 MIN: CPT | Mod: 25

## 2024-04-29 RX ORDER — OMEPRAZOLE 40 MG/1
40 CAPSULE, DELAYED RELEASE ORAL
Qty: 90 | Refills: 3 | Status: ACTIVE | COMMUNITY
Start: 2024-02-11 | End: 1900-01-01

## 2024-04-29 RX ORDER — ALPRAZOLAM 0.5 MG/1
0.5 TABLET ORAL
Qty: 120 | Refills: 0 | Status: ACTIVE | COMMUNITY
Start: 2017-11-15 | End: 1900-01-01

## 2024-04-29 NOTE — REVIEW OF SYSTEMS
[Postnasal Drip] : no postnasal drip [Headache] : headache [Anxiety] : anxiety [Negative] : Respiratory

## 2024-04-29 NOTE — HISTORY OF PRESENT ILLNESS
[FreeTextEntry1] : Patient presents for a medication renewal [de-identified] : 59 yo wf here for follow up - the baby had the first birthday.   headaches frontal . bp is varying . she is bad eating french fries ice cream pasta.  24 I watch the baby   no cp sob.  10/17/23 just got bad news her friends  .  23 I am worried about my dog- he has a limp.    Miya had her baby.  3/29/23  she had woozy spells - ? covid . she checked her bp and it was 160/80's  it came down . her bp is 134-38/79-85  no cp sob . she is loving her new teeth. implants. she is so happy.  22 + covid  22 the surgery went well with the dental implants I was in a lot of pain . I took ibuprofen. I used more xanax than normal since the surgery I have a post nasal drip for 2 weeks

## 2024-04-29 NOTE — ASSESSMENT
[FreeTextEntry1] :  hyperlipidemia Basic cardiovascular prevention measures are advised including regular exercise, surveillance medical examination, and prudent portion-controlled low fat diet, rich in a variety of vegetables with minimal added sugars, refined starches, and no artificially hydrogenated oils. Discussion and interpretation of previous tests , external notes( labs, radiology, specialist  , patient verbalized understanding. Labs drawn/ specimens obtained  in office on this date of service  for evaluation of   assessed conditions -lipid-      to be run at [facility]. anxiety  renew xanax .5 mg qid 120 istop checked.  As per usual protocol the patient was advised in regard to the risks of driving when on medications with side effects of dizziness or drowsiness. Patient has been assessed for increased risk for respiratory depression. Patient denies suicidal ideations or plan.  Istop checked. gerd  omeprazole 40 mg qd 90 qd  elevated bp ,monitor if it is over 130/90 then i will send in meds

## 2024-04-30 ENCOUNTER — TRANSCRIPTION ENCOUNTER (OUTPATIENT)
Age: 59
End: 2024-04-30

## 2024-04-30 LAB
ALBUMIN SERPL ELPH-MCNC: 4.6 G/DL
ALP BLD-CCNC: 62 U/L
ALT SERPL-CCNC: 17 U/L
ANION GAP SERPL CALC-SCNC: 12 MMOL/L
AST SERPL-CCNC: 17 U/L
BILIRUB SERPL-MCNC: 1.1 MG/DL
BUN SERPL-MCNC: 12 MG/DL
CALCIUM SERPL-MCNC: 9.5 MG/DL
CHLORIDE SERPL-SCNC: 106 MMOL/L
CHOLEST SERPL-MCNC: 178 MG/DL
CO2 SERPL-SCNC: 25 MMOL/L
CREAT SERPL-MCNC: 0.72 MG/DL
EGFR: 96 ML/MIN/1.73M2
GLUCOSE SERPL-MCNC: 98 MG/DL
HDLC SERPL-MCNC: 73 MG/DL
LDLC SERPL CALC-MCNC: 80 MG/DL
NONHDLC SERPL-MCNC: 104 MG/DL
POTASSIUM SERPL-SCNC: 4.4 MMOL/L
PROT SERPL-MCNC: 6.8 G/DL
SODIUM SERPL-SCNC: 143 MMOL/L
TRIGL SERPL-MCNC: 146 MG/DL

## 2024-05-05 NOTE — PHYSICAL EXAM
[Normal] : normal rate, regular rhythm, normal S1 and S2 and no murmur heard
Geo Hutchins(Attending)

## 2024-07-26 ENCOUNTER — APPOINTMENT (OUTPATIENT)
Dept: FAMILY MEDICINE | Facility: CLINIC | Age: 59
End: 2024-07-26
Payer: COMMERCIAL

## 2024-07-26 VITALS
WEIGHT: 182 LBS | BODY MASS INDEX: 31.07 KG/M2 | TEMPERATURE: 98.3 F | OXYGEN SATURATION: 98 % | DIASTOLIC BLOOD PRESSURE: 90 MMHG | HEIGHT: 64 IN | HEART RATE: 86 BPM | RESPIRATION RATE: 14 BRPM | SYSTOLIC BLOOD PRESSURE: 130 MMHG

## 2024-07-26 DIAGNOSIS — S46.912D STRAIN OF UNSPECIFIED MUSCLE, FASCIA AND TENDON AT SHOULDER AND UPPER ARM LEVEL, LEFT ARM, SUBSEQUENT ENCOUNTER: ICD-10-CM

## 2024-07-26 PROCEDURE — 99213 OFFICE O/P EST LOW 20 MIN: CPT

## 2024-07-26 NOTE — HEALTH RISK ASSESSMENT
[Never (0 pts)] : Never (0 points) [No] : In the past 12 months have you used drugs other than those required for medical reasons? No [No falls in past year] : Patient reported no falls in the past year [Little interest or pleasure doing things] : 1) Little interest or pleasure doing things [Feeling down, depressed, or hopeless] : 2) Feeling down, depressed, or hopeless [0] : 2) Feeling down, depressed, or hopeless: Not at all (0) [PHQ-2 Negative - No further assessment needed] : PHQ-2 Negative - No further assessment needed [Audit-CScore] : 0 [RLQ8Porng] : 0 [Former] : Former [10-14] : 10-14 [> 15 Years] : > 15 Years

## 2024-07-26 NOTE — HISTORY OF PRESENT ILLNESS
[FreeTextEntry1] : Patient presents for a medication renewal [de-identified] : 57 yo wf hx gerd, hld, anxiety here for follow up - i am gaining weight. I am a worry wort. the dog went in for surgery and i ate bc i was worried.   the left shoulder years ago i took an xray and it showed arthritis. lately it is acting  up. raising laterally or reach  up it hurts.she tries ibuprofen once in a while she uses topical roll on for pain   24  the baby had the first birthday.   headaches frontal . bp is varying . she is bad eating french fries ice cream pasta.  24 I watch the baby   no cp sob.  10/17/23 just got bad news her friends  .  23 I am worried about my dog- he has a limp.    Miya had her baby.  3/29/23  she had woozy spells - ? covid . she checked her bp and it was 160/80's  it came down . her bp is 134-38/79-85  no cp sob . she is loving her new teeth. implants. she is so happy.  22 + covid  22 the surgery went well with the dental implants I was in a lot of pain . I took ibuprofen. I used more xanax than normal since the surgery I have a post nasal drip for 2 weeks

## 2024-07-26 NOTE — REVIEW OF SYSTEMS
[Headache] : headache [Anxiety] : anxiety [Negative] : Respiratory [Postnasal Drip] : no postnasal drip

## 2024-07-26 NOTE — ASSESSMENT
[FreeTextEntry1] :  hyperlipidemia Basic cardiovascular prevention measures are advised including regular exercise, surveillance medical examination, and prudent portion-controlled low fat diet, rich in a variety of vegetables with minimal added sugars, refined starches, and no artificially hydrogenated oils. Discussion and interpretation of previous tests , external notes( labs, radiology, specialist  , patient verbalized understanding.   anxiety  renew xanax .5 mg qid 120 istop checked.  As per usual protocol the patient was advised in regard to the risks of driving when on medications with side effects of dizziness or drowsiness. Patient has been assessed for increased risk for respiratory depression. Patient denies suicidal ideations or plan.  Istop checked. gerd  omeprazole 40 mg qd 90 qd  elevated bp- she want to try again for 3 months for low salt.  ,monitor if it is over 130/90 then i will send in meds

## 2024-11-25 ENCOUNTER — APPOINTMENT (OUTPATIENT)
Dept: FAMILY MEDICINE | Facility: CLINIC | Age: 59
End: 2024-11-25
Payer: COMMERCIAL

## 2024-11-25 VITALS
WEIGHT: 186 LBS | BODY MASS INDEX: 31.76 KG/M2 | HEART RATE: 84 BPM | DIASTOLIC BLOOD PRESSURE: 88 MMHG | OXYGEN SATURATION: 98 % | SYSTOLIC BLOOD PRESSURE: 128 MMHG | RESPIRATION RATE: 14 BRPM | HEIGHT: 64 IN

## 2024-11-25 DIAGNOSIS — E78.5 HYPERLIPIDEMIA, UNSPECIFIED: ICD-10-CM

## 2024-11-25 DIAGNOSIS — M25.511 PAIN IN RIGHT SHOULDER: ICD-10-CM

## 2024-11-25 DIAGNOSIS — F41.9 ANXIETY DISORDER, UNSPECIFIED: ICD-10-CM

## 2024-11-25 PROCEDURE — 99213 OFFICE O/P EST LOW 20 MIN: CPT | Mod: 25

## 2024-11-26 LAB
ALBUMIN SERPL ELPH-MCNC: 4.6 G/DL
ALP BLD-CCNC: 63 U/L
ALT SERPL-CCNC: 14 U/L
ANION GAP SERPL CALC-SCNC: 12 MMOL/L
AST SERPL-CCNC: 15 U/L
BASOPHILS # BLD AUTO: 0.04 K/UL
BASOPHILS NFR BLD AUTO: 0.7 %
BILIRUB SERPL-MCNC: 1.4 MG/DL
BUN SERPL-MCNC: 13 MG/DL
CALCIUM SERPL-MCNC: 10 MG/DL
CHLORIDE SERPL-SCNC: 104 MMOL/L
CHOLEST SERPL-MCNC: 189 MG/DL
CO2 SERPL-SCNC: 26 MMOL/L
CREAT SERPL-MCNC: 0.8 MG/DL
EGFR: 84 ML/MIN/1.73M2
EOSINOPHIL # BLD AUTO: 0.17 K/UL
EOSINOPHIL NFR BLD AUTO: 3 %
GLUCOSE SERPL-MCNC: 107 MG/DL
HCT VFR BLD CALC: 45.1 %
HDLC SERPL-MCNC: 79 MG/DL
HGB BLD-MCNC: 14.9 G/DL
IMM GRANULOCYTES NFR BLD AUTO: 0.4 %
LDLC SERPL CALC-MCNC: 89 MG/DL
LYMPHOCYTES # BLD AUTO: 1.57 K/UL
LYMPHOCYTES NFR BLD AUTO: 27.5 %
MAN DIFF?: NORMAL
MCHC RBC-ENTMCNC: 28 PG
MCHC RBC-ENTMCNC: 33 G/DL
MCV RBC AUTO: 84.8 FL
MONOCYTES # BLD AUTO: 0.4 K/UL
MONOCYTES NFR BLD AUTO: 7 %
NEUTROPHILS # BLD AUTO: 3.5 K/UL
NEUTROPHILS NFR BLD AUTO: 61.4 %
NONHDLC SERPL-MCNC: 110 MG/DL
PLATELET # BLD AUTO: 294 K/UL
POTASSIUM SERPL-SCNC: 4.4 MMOL/L
PROT SERPL-MCNC: 7.3 G/DL
RBC # BLD: 5.32 M/UL
RBC # FLD: 12 %
SODIUM SERPL-SCNC: 141 MMOL/L
TRIGL SERPL-MCNC: 120 MG/DL
WBC # FLD AUTO: 5.7 K/UL

## 2025-03-12 ENCOUNTER — APPOINTMENT (OUTPATIENT)
Dept: FAMILY MEDICINE | Facility: CLINIC | Age: 60
End: 2025-03-12
Payer: COMMERCIAL

## 2025-03-12 VITALS
DIASTOLIC BLOOD PRESSURE: 80 MMHG | BODY MASS INDEX: 34.15 KG/M2 | HEIGHT: 64 IN | WEIGHT: 200 LBS | TEMPERATURE: 98.2 F | SYSTOLIC BLOOD PRESSURE: 122 MMHG | OXYGEN SATURATION: 95 % | RESPIRATION RATE: 14 BRPM | HEART RATE: 89 BPM

## 2025-03-12 DIAGNOSIS — F41.9 ANXIETY DISORDER, UNSPECIFIED: ICD-10-CM

## 2025-03-12 DIAGNOSIS — E78.5 HYPERLIPIDEMIA, UNSPECIFIED: ICD-10-CM

## 2025-03-12 PROCEDURE — 99213 OFFICE O/P EST LOW 20 MIN: CPT | Mod: 25

## 2025-03-12 RX ORDER — TIRZEPATIDE 2.5 MG/.5ML
2.5 INJECTION, SOLUTION SUBCUTANEOUS
Qty: 1 | Refills: 0 | Status: ACTIVE | COMMUNITY
Start: 2025-03-12 | End: 1900-01-01

## 2025-03-13 ENCOUNTER — TRANSCRIPTION ENCOUNTER (OUTPATIENT)
Age: 60
End: 2025-03-13

## 2025-03-13 LAB
25(OH)D3 SERPL-MCNC: 14.8 NG/ML
ALBUMIN SERPL ELPH-MCNC: 4.5 G/DL
ALP BLD-CCNC: 70 U/L
ALT SERPL-CCNC: 17 U/L
ANION GAP SERPL CALC-SCNC: 10 MMOL/L
AST SERPL-CCNC: 17 U/L
BASOPHILS # BLD AUTO: 0.03 K/UL
BASOPHILS NFR BLD AUTO: 0.5 %
BILIRUB SERPL-MCNC: 1.3 MG/DL
BUN SERPL-MCNC: 15 MG/DL
CALCIUM SERPL-MCNC: 9.7 MG/DL
CHLORIDE SERPL-SCNC: 105 MMOL/L
CHOLEST SERPL-MCNC: 204 MG/DL
CO2 SERPL-SCNC: 26 MMOL/L
CREAT SERPL-MCNC: 0.81 MG/DL
EGFRCR SERPLBLD CKD-EPI 2021: 83 ML/MIN/1.73M2
EOSINOPHIL # BLD AUTO: 0.18 K/UL
EOSINOPHIL NFR BLD AUTO: 3.3 %
ESTIMATED AVERAGE GLUCOSE: 117 MG/DL
FERRITIN SERPL-MCNC: 132 NG/ML
FOLATE SERPL-MCNC: 9 NG/ML
GLUCOSE SERPL-MCNC: 110 MG/DL
HBA1C MFR BLD HPLC: 5.7 %
HCT VFR BLD CALC: 46.8 %
HDLC SERPL-MCNC: 68 MG/DL
HGB BLD-MCNC: 15.4 G/DL
IMM GRANULOCYTES NFR BLD AUTO: 0.4 %
IRON SERPL-MCNC: 130 UG/DL
LDLC SERPL CALC-MCNC: 101 MG/DL
LYMPHOCYTES # BLD AUTO: 1.67 K/UL
LYMPHOCYTES NFR BLD AUTO: 30.5 %
MAGNESIUM SERPL-MCNC: 2.1 MG/DL
MAN DIFF?: NORMAL
MCHC RBC-ENTMCNC: 27.7 PG
MCHC RBC-ENTMCNC: 32.9 G/DL
MCV RBC AUTO: 84.2 FL
MONOCYTES # BLD AUTO: 0.39 K/UL
MONOCYTES NFR BLD AUTO: 7.1 %
NEUTROPHILS # BLD AUTO: 3.19 K/UL
NEUTROPHILS NFR BLD AUTO: 58.2 %
NONHDLC SERPL-MCNC: 136 MG/DL
PLATELET # BLD AUTO: 278 K/UL
POTASSIUM SERPL-SCNC: 5.3 MMOL/L
PROT SERPL-MCNC: 7 G/DL
RBC # BLD: 5.56 M/UL
RBC # FLD: 12 %
SODIUM SERPL-SCNC: 141 MMOL/L
T3 SERPL-MCNC: 128 NG/DL
T3FREE SERPL-MCNC: 3.36 PG/ML
T4 FREE SERPL-MCNC: 1.2 NG/DL
TRIGL SERPL-MCNC: 207 MG/DL
TSH SERPL-ACNC: 0.48 UIU/ML
VIT B12 SERPL-MCNC: 436 PG/ML
WBC # FLD AUTO: 5.48 K/UL

## 2025-04-09 ENCOUNTER — APPOINTMENT (OUTPATIENT)
Dept: FAMILY MEDICINE | Facility: CLINIC | Age: 60
End: 2025-04-09
Payer: COMMERCIAL

## 2025-04-09 VITALS
RESPIRATION RATE: 15 BRPM | WEIGHT: 186 LBS | SYSTOLIC BLOOD PRESSURE: 132 MMHG | HEIGHT: 64 IN | HEART RATE: 65 BPM | OXYGEN SATURATION: 95 % | DIASTOLIC BLOOD PRESSURE: 80 MMHG | TEMPERATURE: 97.7 F | BODY MASS INDEX: 31.76 KG/M2

## 2025-04-09 DIAGNOSIS — E78.5 HYPERLIPIDEMIA, UNSPECIFIED: ICD-10-CM

## 2025-04-09 PROCEDURE — 99213 OFFICE O/P EST LOW 20 MIN: CPT

## 2025-05-07 ENCOUNTER — APPOINTMENT (OUTPATIENT)
Dept: FAMILY MEDICINE | Facility: CLINIC | Age: 60
End: 2025-05-07
Payer: COMMERCIAL

## 2025-05-07 VITALS — BODY MASS INDEX: 31.07 KG/M2 | WEIGHT: 181 LBS

## 2025-05-07 DIAGNOSIS — E78.5 HYPERLIPIDEMIA, UNSPECIFIED: ICD-10-CM

## 2025-05-07 PROCEDURE — 99213 OFFICE O/P EST LOW 20 MIN: CPT | Mod: 95

## 2025-06-03 ENCOUNTER — APPOINTMENT (OUTPATIENT)
Dept: FAMILY MEDICINE | Facility: CLINIC | Age: 60
End: 2025-06-03
Payer: COMMERCIAL

## 2025-06-03 VITALS — WEIGHT: 175 LBS | BODY MASS INDEX: 30.04 KG/M2

## 2025-06-03 DIAGNOSIS — E78.5 HYPERLIPIDEMIA, UNSPECIFIED: ICD-10-CM

## 2025-06-03 PROCEDURE — 99213 OFFICE O/P EST LOW 20 MIN: CPT | Mod: 95

## 2025-06-26 ENCOUNTER — APPOINTMENT (OUTPATIENT)
Dept: FAMILY MEDICINE | Facility: CLINIC | Age: 60
End: 2025-06-26
Payer: COMMERCIAL

## 2025-06-26 VITALS
HEIGHT: 64 IN | RESPIRATION RATE: 14 BRPM | BODY MASS INDEX: 28.68 KG/M2 | OXYGEN SATURATION: 98 % | WEIGHT: 168 LBS | HEART RATE: 91 BPM | DIASTOLIC BLOOD PRESSURE: 90 MMHG | TEMPERATURE: 97.9 F | SYSTOLIC BLOOD PRESSURE: 130 MMHG

## 2025-06-26 PROCEDURE — 99214 OFFICE O/P EST MOD 30 MIN: CPT | Mod: 25

## 2025-07-29 ENCOUNTER — APPOINTMENT (OUTPATIENT)
Dept: FAMILY MEDICINE | Facility: CLINIC | Age: 60
End: 2025-07-29
Payer: COMMERCIAL

## 2025-07-29 VITALS — BODY MASS INDEX: 27.64 KG/M2 | WEIGHT: 161 LBS

## 2025-07-29 DIAGNOSIS — E78.5 HYPERLIPIDEMIA, UNSPECIFIED: ICD-10-CM

## 2025-07-29 PROCEDURE — 99213 OFFICE O/P EST LOW 20 MIN: CPT | Mod: 95

## 2025-08-26 ENCOUNTER — APPOINTMENT (OUTPATIENT)
Dept: FAMILY MEDICINE | Facility: CLINIC | Age: 60
End: 2025-08-26
Payer: COMMERCIAL

## 2025-08-26 VITALS — BODY MASS INDEX: 26.95 KG/M2 | WEIGHT: 157 LBS

## 2025-08-26 DIAGNOSIS — E78.5 HYPERLIPIDEMIA, UNSPECIFIED: ICD-10-CM

## 2025-08-26 PROCEDURE — 99213 OFFICE O/P EST LOW 20 MIN: CPT | Mod: 95
